# Patient Record
Sex: FEMALE | Race: WHITE | NOT HISPANIC OR LATINO | Employment: FULL TIME | URBAN - METROPOLITAN AREA
[De-identification: names, ages, dates, MRNs, and addresses within clinical notes are randomized per-mention and may not be internally consistent; named-entity substitution may affect disease eponyms.]

---

## 2018-01-29 ENCOUNTER — HOSPITAL ENCOUNTER (OUTPATIENT)
Facility: MEDICAL CENTER | Age: 18
End: 2018-01-29
Attending: OBSTETRICS & GYNECOLOGY
Payer: COMMERCIAL

## 2018-01-29 ENCOUNTER — HOSPITAL ENCOUNTER (OUTPATIENT)
Dept: LAB | Facility: MEDICAL CENTER | Age: 18
End: 2018-01-29
Attending: OBSTETRICS & GYNECOLOGY
Payer: COMMERCIAL

## 2018-01-29 ENCOUNTER — GYNECOLOGY VISIT (OUTPATIENT)
Dept: OBGYN | Facility: CLINIC | Age: 18
End: 2018-01-29
Payer: COMMERCIAL

## 2018-01-29 VITALS — DIASTOLIC BLOOD PRESSURE: 68 MMHG | SYSTOLIC BLOOD PRESSURE: 108 MMHG | WEIGHT: 140 LBS

## 2018-01-29 DIAGNOSIS — Z87.19 HISTORY OF ORAL LESIONS: ICD-10-CM

## 2018-01-29 DIAGNOSIS — N89.8 VAGINA ITCHING: ICD-10-CM

## 2018-01-29 DIAGNOSIS — N63.0 BREAST LUMP: ICD-10-CM

## 2018-01-29 DIAGNOSIS — Z11.3 SCREENING FOR VENEREAL DISEASE: ICD-10-CM

## 2018-01-29 LAB
CANDIDA DNA VAG QL PROBE+SIG AMP: NEGATIVE
G VAGINALIS DNA VAG QL PROBE+SIG AMP: NEGATIVE
T VAGINALIS DNA VAG QL PROBE+SIG AMP: NEGATIVE

## 2018-01-29 PROCEDURE — 36415 COLL VENOUS BLD VENIPUNCTURE: CPT

## 2018-01-29 PROCEDURE — 86694 HERPES SIMPLEX NES ANTBDY: CPT

## 2018-01-29 PROCEDURE — 87480 CANDIDA DNA DIR PROBE: CPT

## 2018-01-29 PROCEDURE — 87510 GARDNER VAG DNA DIR PROBE: CPT

## 2018-01-29 PROCEDURE — 99385 PREV VISIT NEW AGE 18-39: CPT | Performed by: OBSTETRICS & GYNECOLOGY

## 2018-01-29 PROCEDURE — 87660 TRICHOMONAS VAGIN DIR PROBE: CPT

## 2018-01-29 RX ORDER — LEVONORGESTREL AND ETHINYL ESTRADIOL 0.1-0.02MG
1 KIT ORAL DAILY
COMMUNITY
End: 2018-06-06 | Stop reason: SDUPTHER

## 2018-01-29 ASSESSMENT — ENCOUNTER SYMPTOMS
WEIGHT LOSS: 0
FEVER: 0
CHILLS: 0
PALPITATIONS: 0
MYALGIAS: 0
DIZZINESS: 0
DEPRESSION: 0
COUGH: 0

## 2018-01-29 NOTE — PROGRESS NOTES
Subjective:      Marisel Ramirez is a 17 y.o. female who presents with Gynecologic Exam            HPI   17-year-old G0 last menstrual cycle was approximately one week ago. Patient is here today without complaints and desires screening for HSV because she occasionally has some tingling on her lips. She also states she had a recent yeast infection and would like screening for that as well. She is currently without complaints reports regular monthly menstrual cycles [place] through bleeding or spotting    No history of abnormal Pap smears or sexually transmitted diseases      Review of Systems   Constitutional: Negative for chills, fever, malaise/fatigue and weight loss.   HENT: Negative for hearing loss.    Respiratory: Negative for cough.    Cardiovascular: Negative for chest pain and palpitations.   Genitourinary: Negative for dysuria.   Musculoskeletal: Negative for myalgias.   Skin: Negative for rash.   Neurological: Negative for dizziness.   Psychiatric/Behavioral: Negative for depression.          Objective:     /68   Wt 63.5 kg (140 lb)   LMP 01/23/2018   Breastfeeding? No      Physical Exam   Constitutional: She is oriented to person, place, and time. She appears well-developed and well-nourished.   Neck: Normal range of motion. Neck supple.   Cardiovascular: Normal rate and regular rhythm.    Pulmonary/Chest: She exhibits no mass and no deformity. Right breast exhibits no inverted nipple, no mass, no nipple discharge, no skin change and no tenderness. Left breast exhibits mass. Left breast exhibits no inverted nipple, no nipple discharge, no skin change and no tenderness. Breasts are symmetrical. There is no breast swelling.       Abdominal: Soft.   Genitourinary: Vagina normal. No breast tenderness, discharge or bleeding.   Neurological: She is alert and oriented to person, place, and time.   Skin: Skin is warm and dry.   Psychiatric: She has a normal mood and affect.   Nursing note and vitals  reviewed.              Assessment/Plan:     1. History of oral lesions    - HSV 1/2 IGG W/ TYPE SPECIFIC RFLX; Future    2. Screening for venereal disease  - CHLAMYDIA/GC AMP URINE OR SWAB    3. Breast lump    - US-BREAST COMPLETE-LEFT; Future    4. Vagina itching    - VAGINAL PATHOGENS DNA PANEL; Future

## 2018-01-30 LAB
AMBIGUOUS DTTM AMBI4: NORMAL
SIGNIFICANT IND 70042: NORMAL
SITE SITE: NORMAL
SOURCE SOURCE: NORMAL

## 2018-01-31 LAB — HSV1+2 IGG SER IA-ACNC: 0.54 IV

## 2018-04-20 ENCOUNTER — OFFICE VISIT (OUTPATIENT)
Dept: MEDICAL GROUP | Facility: MEDICAL CENTER | Age: 18
End: 2018-04-20
Payer: COMMERCIAL

## 2018-04-20 VITALS
HEART RATE: 75 BPM | RESPIRATION RATE: 16 BRPM | TEMPERATURE: 97.8 F | SYSTOLIC BLOOD PRESSURE: 110 MMHG | DIASTOLIC BLOOD PRESSURE: 62 MMHG | OXYGEN SATURATION: 96 % | WEIGHT: 140.8 LBS | HEIGHT: 67 IN | BODY MASS INDEX: 22.1 KG/M2

## 2018-04-20 DIAGNOSIS — Z11.3 SCREENING FOR STD (SEXUALLY TRANSMITTED DISEASE): ICD-10-CM

## 2018-04-20 DIAGNOSIS — Z30.09 FAMILY PLANNING: ICD-10-CM

## 2018-04-20 DIAGNOSIS — R30.0 DYSURIA: ICD-10-CM

## 2018-04-20 LAB
APPEARANCE UR: CLEAR
BILIRUB UR STRIP-MCNC: NEGATIVE MG/DL
COLOR UR AUTO: NORMAL
GLUCOSE UR STRIP.AUTO-MCNC: NEGATIVE MG/DL
KETONES UR STRIP.AUTO-MCNC: NEGATIVE MG/DL
LEUKOCYTE ESTERASE UR QL STRIP.AUTO: NEGATIVE
NITRITE UR QL STRIP.AUTO: NEGATIVE
PH UR STRIP.AUTO: 5 [PH] (ref 5–8)
PROT UR QL STRIP: NORMAL MG/DL
RBC UR QL AUTO: NORMAL
SP GR UR STRIP.AUTO: 1.02
UROBILINOGEN UR STRIP-MCNC: NEGATIVE MG/DL

## 2018-04-20 PROCEDURE — 99203 OFFICE O/P NEW LOW 30 MIN: CPT | Mod: 25 | Performed by: NURSE PRACTITIONER

## 2018-04-20 PROCEDURE — 99000 SPECIMEN HANDLING OFFICE-LAB: CPT | Performed by: NURSE PRACTITIONER

## 2018-04-20 PROCEDURE — 81002 URINALYSIS NONAUTO W/O SCOPE: CPT | Performed by: NURSE PRACTITIONER

## 2018-04-20 ASSESSMENT — PATIENT HEALTH QUESTIONNAIRE - PHQ9: CLINICAL INTERPRETATION OF PHQ2 SCORE: 0

## 2018-04-20 NOTE — ASSESSMENT & PLAN NOTE
Intermittent difficulty with dysuria over the last several weeks, not occurring with every urination. She does note a difference when she increases her water consumption. No urinary frequency, odor, hematuria

## 2018-04-20 NOTE — PROGRESS NOTES
"Subjective:     Chief Complaint   Patient presents with   • Establish Care   • UTI     Marisel Ramirez is a 17 y.o. female here today to establish care. She is a student at [employer], studying business. She lives with roommates. Originally from [place]. We discussed:    Family planning  Doing well with current OCP  Not currently sexually active.  She reports having had chlamydia infection which was treated last year, she would like retesting. Denies symptoms including discharge and irritation  No hx of HTN, DVT/ clotting disorder.    Dysuria  Intermittent difficulty with dysuria over the last several weeks, not occurring with every urination. She does note a difference when she increases her water consumption. No urinary frequency, odor, hematuria       Current medicines (including changes today)  Current Outpatient Prescriptions   Medication Sig Dispense Refill   • levonorgestrel-ethinyl estradiol (AVIANE) 0.1-20 MG-MCG per tablet Take 1 Tab by mouth every day.       No current facility-administered medications for this visit.      She  has a past medical history of Migraine.    ROS included above     Objective:     Blood pressure 110/62, pulse 75, temperature 36.6 °C (97.8 °F), resp. rate 16, height 1.69 m (5' 6.53\"), weight 63.9 kg (140 lb 12.8 oz), last menstrual period 04/15/2018, SpO2 96 %, not currently breastfeeding. Body mass index is 22.36 kg/m².     Physical Exam:  General: Alert, oriented in no acute distress.  Eye contact is good, speech is normal, affect calm  HEENT: Oral mucosa pink moist, no lesions. TMs gray with good landmarks bilaterally. No lymphadenopathy.  Lungs: clear to auscultation bilaterally, normal effort, no wheeze/ rhonchi/ rales.  CV: regular rate and rhythm, S1, S2, no murmur  Abdomen: soft, nontender  Ext: no edema, color normal, vascularity normal, temperature normal    Assessment and Plan:   The following treatment plan was discussed   1. Family planning  She is doing well with current " oral contraceptive and does not need refill at this time.    2. Dysuria  UA with trace amount of blood, states that she just finished her menses. Otherwise no indication of infection. Advised increase water consumption, follow-up for persisting concerns  POCT Urinalysis   3. Screening for STD (sexually transmitted disease)  History of chlamydia which was treated. Requesting retesting. No symptoms. Self-administered vaginal swab sent  CHLAMYDIA/GC PCR URINE OR SWAB       Followup: Pending labs         Please note that this dictation was created using voice recognition software. I have worked with consultants from the vendor as well as technical experts from [facility] to optimize the interface. I have made every reasonable attempt to correct obvious errors, but I expect that there are errors of grammar and possibly content that I did not discover before finalizing the note.

## 2018-04-20 NOTE — ASSESSMENT & PLAN NOTE
Doing well with current OCP  Not currently sexually active.  She reports having had chlamydia infection which was treated last year, she would like retesting. Denies symptoms including discharge and irritation  No hx of HTN, DVT/ clotting disorder.

## 2018-04-23 ENCOUNTER — TELEPHONE (OUTPATIENT)
Dept: MEDICAL GROUP | Facility: MEDICAL CENTER | Age: 18
End: 2018-04-23

## 2018-04-23 DIAGNOSIS — Z11.3 SCREEN FOR STD (SEXUALLY TRANSMITTED DISEASE): ICD-10-CM

## 2018-04-23 NOTE — TELEPHONE ENCOUNTER
Urine test ordered. Please inform pt and notify her it must be at least 2 hours since last void for test to be accurate. She can  order or we can fax.

## 2018-04-23 NOTE — TELEPHONE ENCOUNTER
Phone Number Called: [employer] [phone]    Message: Received fax from [employer] stating wrong Chlamydia specimen was sent - Calling for more information. Wrong swab used.     Left Message for patient to call back: no

## 2018-04-23 NOTE — TELEPHONE ENCOUNTER
Phone Number Called: [phone] (home)     Message: Left message for pt to call back at [phone].     Left Message for patient to call back: yes

## 2018-04-23 NOTE — TELEPHONE ENCOUNTER
[facility] rep stated they can take a urine sample to complete the test at any of their lab locations.

## 2018-05-10 ENCOUNTER — APPOINTMENT (RX ONLY)
Dept: URBAN - METROPOLITAN AREA CLINIC 4 | Facility: CLINIC | Age: 18
Setting detail: DERMATOLOGY
End: 2018-05-10

## 2018-05-10 DIAGNOSIS — L259 CONTACT DERMATITIS AND OTHER ECZEMA, UNSPECIFIED CAUSE: ICD-10-CM

## 2018-05-10 PROBLEM — H01.131 ECZEMATOUS DERMATITIS OF RIGHT UPPER EYELID: Status: ACTIVE | Noted: 2018-05-10

## 2018-05-10 PROBLEM — L30.8 OTHER SPECIFIED DERMATITIS: Status: ACTIVE | Noted: 2018-05-10

## 2018-05-10 PROCEDURE — ? PRESCRIPTION

## 2018-05-10 PROCEDURE — ? COUNSELING

## 2018-05-10 PROCEDURE — ? ADDITIONAL NOTES

## 2018-05-10 PROCEDURE — 99202 OFFICE O/P NEW SF 15 MIN: CPT

## 2018-05-10 RX ORDER — PIMECROLIMUS 10 MG/G
CREAM TOPICAL
Qty: 60 | Refills: 6 | Status: ERX | COMMUNITY
Start: 2018-05-10

## 2018-05-10 RX ADMIN — PIMECROLIMUS: 10 CREAM TOPICAL at 00:00

## 2018-05-10 ASSESSMENT — LOCATION ZONE DERM: LOCATION ZONE: EYELID

## 2018-05-10 ASSESSMENT — LOCATION DETAILED DESCRIPTION DERM
LOCATION DETAILED: RIGHT LATERAL SUPERIOR EYELID
LOCATION DETAILED: LEFT LATERAL SUPERIOR EYELID

## 2018-05-10 ASSESSMENT — LOCATION SIMPLE DESCRIPTION DERM
LOCATION SIMPLE: RIGHT SUPERIOR EYELID
LOCATION SIMPLE: LEFT SUPERIOR EYELID

## 2018-05-10 NOTE — PROCEDURE: ADDITIONAL NOTES
Additional Notes: Patient advised to take a Claritin in the morning and at night. \\nPatient advised to keep a symptom log. \\nPatient advised to use and keep a fragrance free and dye free protocol. \\nPatient advised to avoid any harsh face washes, eyeshadow and other makeup products. \\nWill recheck progress in 3 weeks. \\nAdvised patient to contact our office if rash worsens.

## 2018-05-10 NOTE — HPI: RASH (EYELID DERMATITIS)
Is This A New Presentation, Or A Follow-Up?: Rash
How Severe Is Your Rash?: mild
Response To Previous Treatment?: topical treatments have been effective, but the eruption recurs after stopping the medication
Additional History: Patient states she has a history of eczema. The rash started on her eyelids about 6 months ago. Patient states that she went to a Doctor in [place] in January who have her an Rx Moisturizer (patient does not know the name) which helped with the scaliness but did not resolve the rash.

## 2018-05-31 ENCOUNTER — APPOINTMENT (RX ONLY)
Dept: URBAN - METROPOLITAN AREA CLINIC 4 | Facility: CLINIC | Age: 18
Setting detail: DERMATOLOGY
End: 2018-05-31

## 2018-05-31 DIAGNOSIS — L259 CONTACT DERMATITIS AND OTHER ECZEMA, UNSPECIFIED CAUSE: ICD-10-CM

## 2018-05-31 DIAGNOSIS — L70.0 ACNE VULGARIS: ICD-10-CM

## 2018-05-31 PROBLEM — L30.8 OTHER SPECIFIED DERMATITIS: Status: ACTIVE | Noted: 2018-05-31

## 2018-05-31 PROCEDURE — ? COUNSELING

## 2018-05-31 PROCEDURE — 99213 OFFICE O/P EST LOW 20 MIN: CPT

## 2018-05-31 PROCEDURE — ? PRESCRIPTION

## 2018-05-31 PROCEDURE — ? TREATMENT REGIMEN

## 2018-05-31 RX ORDER — ADAPALENE AND BENZOYL PEROXIDE 1; 25 MG/G; MG/G
GEL TOPICAL
Qty: 1 | Refills: 6 | Status: ERX | COMMUNITY
Start: 2018-05-31

## 2018-05-31 RX ADMIN — ADAPALENE AND BENZOYL PEROXIDE: 1; 25 GEL TOPICAL at 15:51

## 2018-05-31 ASSESSMENT — LOCATION SIMPLE DESCRIPTION DERM
LOCATION SIMPLE: LEFT CHEEK
LOCATION SIMPLE: CHIN
LOCATION SIMPLE: LEFT FOREHEAD
LOCATION SIMPLE: LEFT SUPERIOR EYELID
LOCATION SIMPLE: RIGHT CHEEK
LOCATION SIMPLE: RIGHT EYEBROW

## 2018-05-31 ASSESSMENT — LOCATION DETAILED DESCRIPTION DERM
LOCATION DETAILED: RIGHT INFERIOR MEDIAL MALAR CHEEK
LOCATION DETAILED: RIGHT CHIN
LOCATION DETAILED: LEFT INFERIOR MEDIAL FOREHEAD
LOCATION DETAILED: LEFT INFERIOR CENTRAL MALAR CHEEK
LOCATION DETAILED: LEFT CHIN
LOCATION DETAILED: LEFT LATERAL SUPERIOR EYELID
LOCATION DETAILED: RIGHT CENTRAL EYEBROW
LOCATION DETAILED: RIGHT INFERIOR CENTRAL MALAR CHEEK

## 2018-05-31 ASSESSMENT — LOCATION ZONE DERM
LOCATION ZONE: FACE
LOCATION ZONE: EYELID

## 2018-05-31 NOTE — PROCEDURE: COUNSELING
Spironolactone Counseling: Patient advised regarding risks of diarrhea, abdominal pain, hyperkalemia, birth defects (for female patients), liver toxicity and renal toxicity. The patient may need blood work to monitor liver and kidney function and potassium levels while on therapy. The patient verbalized understanding of the proper use and possible adverse effects of spironolactone.  All of the patient's questions and concerns were addressed.
Tetracycline Counseling: Patient counseled regarding possible photosensitivity and increased risk for sunburn.  Patient instructed to avoid sunlight, if possible.  When exposed to sunlight, patients should wear protective clothing, sunglasses, and sunscreen.  The patient was instructed to call the office immediately if the following severe adverse effects occur:  hearing changes, easy bruising/bleeding, severe headache, or vision changes.  The patient verbalized understanding of the proper use and possible adverse effects of tetracycline.  All of the patient's questions and concerns were addressed. Patient understands to avoid pregnancy while on therapy due to potential birth defects.
Erythromycin Pregnancy And Lactation Text: This medication is Pregnancy Category B and is considered safe during pregnancy. It is also excreted in breast milk.
Spironolactone Pregnancy And Lactation Text: This medication can cause feminization of the male fetus and should be avoided during pregnancy. The active metabolite is also found in breast milk.
Topical Clindamycin Counseling: Patient counseled that this medication may cause skin irritation or allergic reactions.  In the event of skin irritation, the patient was advised to reduce the amount of the drug applied or use it less frequently.   The patient verbalized understanding of the proper use and possible adverse effects of clindamycin.  All of the patient's questions and concerns were addressed.
Birth Control Pills Pregnancy And Lactation Text: This medication should be avoided if pregnant and for the first 30 days post-partum.
Doxycycline Counseling:  Patient counseled regarding possible photosensitivity and increased risk for sunburn.  Patient instructed to avoid sunlight, if possible.  When exposed to sunlight, patients should wear protective clothing, sunglasses, and sunscreen.  The patient was instructed to call the office immediately if the following severe adverse effects occur:  hearing changes, easy bruising/bleeding, severe headache, or vision changes.  The patient verbalized understanding of the proper use and possible adverse effects of doxycycline.  All of the patient's questions and concerns were addressed.
Isotretinoin Pregnancy And Lactation Text: This medication is Pregnancy Category X and is considered extremely dangerous during pregnancy. It is unknown if it is excreted in breast milk.
Tazorac Pregnancy And Lactation Text: This medication is not safe during pregnancy. It is unknown if this medication is excreted in breast milk.
Topical Sulfur Applications Counseling: Topical Sulfur Counseling: Patient counseled that this medication may cause skin irritation or allergic reactions.  In the event of skin irritation, the patient was advised to reduce the amount of the drug applied or use it less frequently.   The patient verbalized understanding of the proper use and possible adverse effects of topical sulfur application.  All of the patient's questions and concerns were addressed.
Tazorac Counseling:  Patient advised that medication is irritating and drying.  Patient may need to apply sparingly and wash off after an hour before eventually leaving it on overnight.  The patient verbalized understanding of the proper use and possible adverse effects of tazorac.  All of the patient's questions and concerns were addressed.
High Dose Vitamin A Pregnancy And Lactation Text: High dose vitamin A therapy is contraindicated during pregnancy and breast feeding.
Minocycline Counseling: Patient advised regarding possible photosensitivity and discoloration of the teeth, skin, lips, tongue and gums.  Patient instructed to avoid sunlight, if possible.  When exposed to sunlight, patients should wear protective clothing, sunglasses, and sunscreen.  The patient was instructed to call the office immediately if the following severe adverse effects occur:  hearing changes, easy bruising/bleeding, severe headache, or vision changes.  The patient verbalized understanding of the proper use and possible adverse effects of minocycline.  All of the patient's questions and concerns were addressed.
Include Pregnancy/Lactation Warning?: No
Minocycline Pregnancy And Lactation Text: This medication is Pregnancy Category D and not consider safe during pregnancy. It is also excreted in breast milk.
Benzoyl Peroxide Pregnancy And Lactation Text: This medication is Pregnancy Category C. It is unknown if benzoyl peroxide is excreted in breast milk.
Bactrim Pregnancy And Lactation Text: This medication is Pregnancy Category D and is known to cause fetal risk.  It is also excreted in breast milk.
Erythromycin Counseling:  I discussed with the patient the risks of erythromycin including but not limited to GI upset, allergic reaction, drug rash, diarrhea, increase in liver enzymes, and yeast infections.
Topical Retinoid Pregnancy And Lactation Text: This medication is Pregnancy Category C. It is unknown if this medication is excreted in breast milk.
Bactrim Counseling:  I discussed with the patient the risks of sulfa antibiotics including but not limited to GI upset, allergic reaction, drug rash, diarrhea, dizziness, photosensitivity, and yeast infections.  Rarely, more serious reactions can occur including but not limited to aplastic anemia, agranulocytosis, methemoglobinemia, blood dyscrasias, liver or kidney failure, lung infiltrates or desquamative/blistering drug rashes.
High Dose Vitamin A Counseling: Side effects reviewed, pt to contact office should one occur.
Topical Retinoid counseling:  Patient advised to apply a pea-sized amount only at bedtime and wait 30 minutes after washing their face before applying.  If too drying, patient may add a non-comedogenic moisturizer. The patient verbalized understanding of the proper use and possible adverse effects of retinoids.  All of the patient's questions and concerns were addressed.
Birth Control Pills Counseling: Birth Control Pill Counseling: I discussed with the patient the potential side effects of OCPs including but not limited to increased risk of stroke, heart attack, thrombophlebitis, deep venous thrombosis, hepatic adenomas, breast changes, GI upset, headaches, and depression.  The patient verbalized understanding of the proper use and possible adverse effects of OCPs. All of the patient's questions and concerns were addressed.
Dapsone Pregnancy And Lactation Text: This medication is Pregnancy Category C and is not considered safe during pregnancy or breast feeding.
Isotretinoin Counseling: Patient should get monthly blood tests, not donate blood, not drive at night if vision affected, not share medication, and not undergo elective surgery for 6 months after tx completed. Side effects reviewed, pt to contact office should one occur.
Detail Level: Zone
Topical Clindamycin Pregnancy And Lactation Text: This medication is Pregnancy Category B and is considered safe during pregnancy. It is unknown if it is excreted in breast milk.
Dapsone Counseling: I discussed with the patient the risks of dapsone including but not limited to hemolytic anemia, agranulocytosis, rashes, methemoglobinemia, kidney failure, peripheral neuropathy, headaches, GI upset, and liver toxicity.  Patients who start dapsone require monitoring including baseline LFTs and weekly CBCs for the first month, then every month thereafter.  The patient verbalized understanding of the proper use and possible adverse effects of dapsone.  All of the patient's questions and concerns were addressed.
Azithromycin Pregnancy And Lactation Text: This medication is considered safe during pregnancy and is also secreted in breast milk.
Benzoyl Peroxide Counseling: Patient counseled that medicine may cause skin irritation and bleach clothing.  In the event of skin irritation, the patient was advised to reduce the amount of the drug applied or use it less frequently.   The patient verbalized understanding of the proper use and possible adverse effects of benzoyl peroxide.  All of the patient's questions and concerns were addressed.
Topical Sulfur Applications Pregnancy And Lactation Text: This medication is Pregnancy Category C and has an unknown safety profile during pregnancy. It is unknown if this topical medication is excreted in breast milk.
Azithromycin Counseling:  I discussed with the patient the risks of azithromycin including but not limited to GI upset, allergic reaction, drug rash, diarrhea, and yeast infections.
Doxycycline Pregnancy And Lactation Text: This medication is Pregnancy Category D and not consider safe during pregnancy. It is also excreted in breast milk but is considered safe for shorter treatment courses.

## 2018-05-31 NOTE — HPI: PIMPLES (ACNE)
How Severe Is Your Acne?: moderate
Is This A New Presentation, Or A Follow-Up?: Acne
Females Only: When Was Your Last Menstrual Period?: 05/14/18

## 2018-05-31 NOTE — PROCEDURE: TREATMENT REGIMEN
Initiate Treatment: Wash face with gentle Cetaphil cleanser. Apply daily non-comedogenic, spf moisturizer containing zinc.\\nWash face with gentle Cetaphil cleanser each night. Then apply adapelene-benzoyl peroxide gel to entire face. Apply nightly non comedogenic moisturizer.
Notification: Please note that there are new Treatment Regimen plans which have an enhanced patient education handout experience.  The plans are called Treatment Regimen (Acne), Treatment Regimen (Atopic Dermatitis), Treatment Regimen (Rosacea), Treatment Regimen (Sun Protection), Treatment Regimen (Cosmetic Products), and Treatment Regimen (Xerosis).
Detail Level: Zone

## 2018-06-06 RX ORDER — LEVONORGESTREL AND ETHINYL ESTRADIOL 0.1-0.02MG
1 KIT ORAL DAILY
Qty: 84 TAB | Refills: 3 | Status: SHIPPED | OUTPATIENT
Start: 2018-06-06 | End: 2020-02-19

## 2018-06-27 ENCOUNTER — GYNECOLOGY VISIT (OUTPATIENT)
Dept: OBGYN | Facility: CLINIC | Age: 18
End: 2018-06-27
Payer: COMMERCIAL

## 2018-06-27 VITALS
WEIGHT: 138 LBS | HEIGHT: 67 IN | SYSTOLIC BLOOD PRESSURE: 100 MMHG | DIASTOLIC BLOOD PRESSURE: 60 MMHG | BODY MASS INDEX: 21.66 KG/M2

## 2018-06-27 DIAGNOSIS — Z11.3 SCREENING FOR STDS (SEXUALLY TRANSMITTED DISEASES): ICD-10-CM

## 2018-06-27 PROCEDURE — 99213 OFFICE O/P EST LOW 20 MIN: CPT | Performed by: OBSTETRICS & GYNECOLOGY

## 2018-06-27 NOTE — PROGRESS NOTES
"Chief complaint dysuria screening for sexually transmitted diseases    17-year-old G0 who is here today complaining of a history of dysuria approximately 2 weeks ago. The dysuria has since resolved . She is also desiring screening for sexually transmitted diseases. Today she is without complaints. She denies abnormal vaginal bleeding or pelvic pain or pressure no abdominal pain fever nausea vomiting or shortness of breath. She denies vaginal discharge.    /60   Ht 1.69 m (5' 6.53\")   Wt 62.6 kg (138 lb)   LMP 06/09/2018   BMI 21.92 kg/m²   Abdomen soft and nontender  Normal external female genitalia  No abnormal vaginal discharge was appreciated  Cervix was normal  No cervical motion tenderness  Uterus small nontender mobile  Adnexa bilaterally not tender    GC chlamydia swab is collected    Urinalysis normal    Assessment and plan  17-year-old female with normal urinalysis resolved dysuria  GC chlamydia culture obtained  Follow-up as needed or one year  "

## 2018-06-29 LAB
C TRACH DNA SPEC QL NAA+PROBE: NOT DETECTED
N GONORRHOEA DNA SPEC QL NAA+PROBE: NOT DETECTED

## 2018-10-03 ENCOUNTER — OFFICE VISIT (OUTPATIENT)
Dept: MEDICAL GROUP | Facility: MEDICAL CENTER | Age: 18
End: 2018-10-03
Payer: COMMERCIAL

## 2018-10-03 VITALS
OXYGEN SATURATION: 96 % | HEIGHT: 67 IN | DIASTOLIC BLOOD PRESSURE: 62 MMHG | SYSTOLIC BLOOD PRESSURE: 102 MMHG | HEART RATE: 77 BPM | BODY MASS INDEX: 20.88 KG/M2 | WEIGHT: 133 LBS | TEMPERATURE: 97.9 F

## 2018-10-03 DIAGNOSIS — N75.0 CYST OF RIGHT BARTHOLIN'S GLAND: ICD-10-CM

## 2018-10-03 PROCEDURE — 99214 OFFICE O/P EST MOD 30 MIN: CPT | Performed by: FAMILY MEDICINE

## 2018-10-03 NOTE — PATIENT INSTRUCTIONS
Bartholin Cyst or Abscess  Introduction  A Bartholin cyst is a fluid-filled sac that forms on a Bartholin gland. Bartholin glands are small glands that are located within the folds of skin (labia) along the sides of the lower opening of the vagina. These glands produce a fluid to moisten the outside of the vagina during sexual intercourse.  A Bartholin cyst causes a bulge on the side of the vagina. A cyst that is not large or infected may not cause symptoms or problems. However, if the fluid within the cyst becomes infected, the cyst can turn into an abscess. An abscess may cause discomfort or pain.  What are the causes?  A Bartholin cyst may develop when the duct of the gland becomes blocked. In many cases, the cause of this is not known. Various kinds of bacteria can cause the cyst to become infected and develop into an abscess.  What increases the risk?  You may be at an increased risk of developing a Bartholin cyst or abscess if:  · You are a woman of reproductive age.  · You have a history of previous Bartholin cysts or abscesses.  · You have diabetes.  · You have a sexually transmitted disease (STD).  What are the signs or symptoms?  The severity of symptoms varies depending on the size of the cyst and whether it is infected. Symptoms may include:  · A bulge or swelling near the lower opening of your vagina.  · Discomfort or pain.  · Redness.  · Pain during sexual intercourse.  · Pain when walking.  · Fluid draining from the area.  How is this diagnosed?  Your health care provider may make a diagnosis based on your symptoms and a physical exam. He or she will look for swelling in your vaginal area. Blood tests may be done to check for infections. A sample of fluid from the cyst or abscess may also be taken to be tested in a lab.  How is this treated?  Small cysts that are not infected may not require any treatment. These often go away on their own. Your health care provider will recommend hot baths and the use  of warm compresses. These may also be part of the treatment for an abscess. Treatment options for a large cyst or abscess may include:  · Antibiotic medicine.  · A surgical procedure to drain the abscess. One of the following procedures may be done:  ¨ Incision and drainage. An incision is made in the cyst or abscess so that the fluid drains out. A catheter may be placed inside the cyst so that it does not close and fill up with fluid again. The catheter will be removed after you have a follow-up visit with a specialist (gynecologist).  ¨ Marsupialization. The cyst or abscess is opened and kept open by stitching the edges of the skin to the walls of the cyst or abscess. This allows it to continue to drain and not fill up with fluid again.  If you have cysts or abscesses that keep returning and have required incision and drainage multiple times, your health care provider may talk to you about surgery to remove the Bartholin gland.  Follow these instructions at home:  · Take medicines only as directed by your health care provider.  · If you were prescribed an antibiotic medicine, finish it all even if you start to feel better.  · Apply warm, wet compresses to the area or take warm, shallow baths that cover your pelvic region (sitz baths) several times a day or as directed by your health care provider.  · Do not squeeze the cyst or apply heavy pressure to it.  · Do not have sexual intercourse until the cyst has gone away.  · If your cyst or abscess was opened, a small piece of gauze or a drain may have been placed in the area to allow drainage. Do not remove the gauze or the drain until directed by your health care provider.  · Wear feminine pads--not tampons--as needed for any drainage or bleeding.  · Keep all follow-up visits as directed by your health care provider. This is important.  How is this prevented?  Take these steps to help prevent a Bartholin cyst from returning:  · Practice good hygiene.  · Clean your  vaginal area with mild soap and a soft cloth when you bathe.  · Practice safe sex to prevent STDs.  Contact a health care provider if:  · You have increased pain, swelling, or redness in the area of the cyst.  · Puslike drainage is coming from the cyst.  · You have a fever.  This information is not intended to replace advice given to you by your health care provider. Make sure you discuss any questions you have with your health care provider.  Document Released: 12/18/2006 Document Revised: 05/25/2017 Document Reviewed: 08/03/2015  © 2017 [employer]

## 2018-10-03 NOTE — PROGRESS NOTES
"Subjective:   Marisel Ramirez is a 18 y.o. female here today for cyst    Cyst of right Bartholin's gland  For the last few days patient has had a right sided labial swelling, that is slightly painful.  She states that it has gone down in size.  Patient believes she has had this in the past, but it resolved spontaneously. Patient denies any fever.         Current medicines (including changes today)  Current Outpatient Prescriptions   Medication Sig Dispense Refill   • levonorgestrel-ethinyl estradiol (AVIANE) 0.1-20 MG-MCG per tablet Take 1 Tab by mouth every day. 84 Tab 3     No current facility-administered medications for this visit.      She  has a past medical history of Migraine.    ROS   No abdominal pain, no dysuria       Objective:     Blood pressure 102/62, pulse 77, temperature 36.6 °C (97.9 °F), height 1.689 m (5' 6.5\"), weight 60.3 kg (133 lb), SpO2 96 %, not currently breastfeeding. Body mass index is 21.15 kg/m².   Physical Exam:  Constitutional: Alert, no distress.  Skin: Warm, dry, good turgor, no rashes in visible areas.  Eye: Equal, round and reactive, conjunctiva clear, lids normal.  Psych: Alert and oriented x3, normal affect and mood.  Vaginal: Right nontender, nonerythemous Bartholin cyst with no drainage or ulceration.      Assessment and Plan:   The following treatment plan was discussed    1. Cyst of right Bartholin's gland  Patient would like to try home treatments such as sitz bathes. If no improvement, things get worse or more symptomatic, then she is to call for a GYN referral.      Followup: Return if symptoms worsen or fail to improve.         A chaperone was offered to the patient during today's exam. Chaperone name: Marielle Roche was present.    "

## 2018-10-03 NOTE — ASSESSMENT & PLAN NOTE
For the last few days patient has had a right sided labial swelling, that is slightly painful.  She states that it has gone down in size.  Patient believes she has had this in the past, but it resolved spontaneously. Patient denies any fever.

## 2018-10-31 ENCOUNTER — APPOINTMENT (RX ONLY)
Dept: URBAN - METROPOLITAN AREA CLINIC 4 | Facility: CLINIC | Age: 18
Setting detail: DERMATOLOGY
End: 2018-10-31

## 2018-10-31 DIAGNOSIS — L70.0 ACNE VULGARIS: ICD-10-CM

## 2018-10-31 PROCEDURE — ? TREATMENT REGIMEN

## 2018-10-31 NOTE — PROCEDURE: TREATMENT REGIMEN
Notification: Please note that there are new Treatment Regimen plans which have an enhanced patient education handout experience.  The plans are called Treatment Regimen (Acne), Treatment Regimen (Atopic Dermatitis), Treatment Regimen (Rosacea), Treatment Regimen (Sun Protection), Treatment Regimen (Cosmetic Products), and Treatment Regimen (Xerosis).
Initiate Treatment: Wash affected areas with Benzoyl Peroxide each morning, leave on for 2-5 minutes then rinse. Apply topical Clindamycin solution and let dry. Apply daily non-comedogenic, spf moisturizer containing zinc.\\nWash face with gentle Cetaphil cleanser each night. Apply 1/2 pea sized amount of tretinoin 0.05% mixed with nightly non comedogenic moisturizer to entire face.
Detail Level: Zone

## 2019-01-01 ENCOUNTER — OFFICE VISIT (OUTPATIENT)
Dept: URGENT CARE | Facility: CLINIC | Age: 19
End: 2019-01-01
Payer: COMMERCIAL

## 2019-01-01 VITALS
TEMPERATURE: 98.4 F | HEART RATE: 123 BPM | OXYGEN SATURATION: 90 % | RESPIRATION RATE: 18 BRPM | DIASTOLIC BLOOD PRESSURE: 62 MMHG | SYSTOLIC BLOOD PRESSURE: 106 MMHG

## 2019-01-01 DIAGNOSIS — J02.9 ACUTE PHARYNGITIS, UNSPECIFIED ETIOLOGY: ICD-10-CM

## 2019-01-01 DIAGNOSIS — F17.200 SMOKER: ICD-10-CM

## 2019-01-01 DIAGNOSIS — R06.2 WHEEZING: ICD-10-CM

## 2019-01-01 DIAGNOSIS — R06.02 SOB (SHORTNESS OF BREATH): ICD-10-CM

## 2019-01-01 DIAGNOSIS — J20.9 ACUTE BRONCHITIS, UNSPECIFIED ORGANISM: ICD-10-CM

## 2019-01-01 PROCEDURE — 94640 AIRWAY INHALATION TREATMENT: CPT | Performed by: NURSE PRACTITIONER

## 2019-01-01 PROCEDURE — 99214 OFFICE O/P EST MOD 30 MIN: CPT | Mod: 25 | Performed by: NURSE PRACTITIONER

## 2019-01-01 RX ORDER — IPRATROPIUM BROMIDE AND ALBUTEROL SULFATE 2.5; .5 MG/3ML; MG/3ML
3 SOLUTION RESPIRATORY (INHALATION) ONCE
Status: COMPLETED | OUTPATIENT
Start: 2019-01-01 | End: 2019-01-01

## 2019-01-01 RX ORDER — AZITHROMYCIN 250 MG/1
TABLET, FILM COATED ORAL
Qty: 6 TAB | Refills: 0 | Status: SHIPPED | OUTPATIENT
Start: 2019-01-01 | End: 2019-03-05

## 2019-01-01 RX ORDER — METHYLPREDNISOLONE 4 MG/1
4 TABLET ORAL DAILY
Qty: 1 KIT | Refills: 0 | Status: SHIPPED | OUTPATIENT
Start: 2019-01-01 | End: 2019-03-05

## 2019-01-01 RX ADMIN — IPRATROPIUM BROMIDE AND ALBUTEROL SULFATE 3 ML: 2.5; .5 SOLUTION RESPIRATORY (INHALATION) at 12:59

## 2019-01-01 ASSESSMENT — ENCOUNTER SYMPTOMS
WHEEZING: 1
SHORTNESS OF BREATH: 1
SORE THROAT: 1
COUGH: 1

## 2019-01-01 NOTE — PROGRESS NOTES
Subjective:      Marisel Ramirez is a 18 y.o. female who presents with Cough (x5 days, productive cough with green or clear mucus); Shortness of Breath (x5 days, wheezing); Nasal Congestion (x5 days); and Pharyngitis (x5 days)            Cough   This is a new problem. Episode onset: pt reports 5 days of coughing, ST, SOB and wheezing with coughing. She denies any recent fever. States she has not smoked weed in a week since getting sick. The problem has been gradually worsening. The cough is non-productive. Associated symptoms include a sore throat, shortness of breath and wheezing. Associated symptoms comments: States her throat feels really tight and it is hard to swallow. Drinking fluids ok. She has tried OTC cough suppressant and rest for the symptoms. The treatment provided no relief. Her past medical history is significant for bronchitis. There is no history of asthma.       Review of Systems   HENT: Positive for sore throat.    Respiratory: Positive for cough, shortness of breath and wheezing.    All other systems reviewed and are negative.    Past Medical History:   Diagnosis Date   • Migraine     No past surgical history on file.   Social History     Social History   • Marital status: Other     Spouse name: N/A   • Number of children: N/A   • Years of education: N/A     Occupational History   • Not on file.     Social History Main Topics   • Smoking status: Never Smoker   • Smokeless tobacco: Never Used   • Alcohol use No   • Drug use: Yes     Types: Marijuana   • Sexual activity: Yes     Partners: Male     Birth control/ protection: Pill     Other Topics Concern   • Not on file     Social History Narrative   • No narrative on file          Objective:     /62   Pulse (!) 123   Temp 36.9 °C (98.4 °F)   Resp 18   SpO2 90%      Physical Exam   Constitutional: She is oriented to person, place, and time. Vital signs are normal. She appears well-developed and well-nourished.   HENT:   Head: Normocephalic  and atraumatic.   Right Ear: External ear normal.   Left Ear: External ear normal.   Mouth/Throat: Posterior oropharyngeal erythema (mild) present.   Eyes: Pupils are equal, round, and reactive to light. EOM are normal.   Neck: Normal range of motion.   Cardiovascular: Normal rate and regular rhythm.    Pulmonary/Chest: Effort normal. She has wheezes. She has rhonchi.   Wheezes and tight breath sounds in all fields   Musculoskeletal: Normal range of motion.   Neurological: She is alert and oriented to person, place, and time.   Skin: Skin is warm and dry. Capillary refill takes less than 2 seconds.   Psychiatric: She has a normal mood and affect. Her speech is normal and behavior is normal. Thought content normal.   Vitals reviewed.              Assessment/Plan:     1. Acute pharyngitis, unspecified etiology  - azithromycin (ZITHROMAX) 250 MG Tab; Take 2 tabs PO on the first day, then one tab PO daily for 4 days  Dispense: 6 Tab; Refill: 0    2. Wheezing  - ipratropium-albuterol (DUONEB) nebulizer solution; 3 mL by Nebulization route Once.  - MethylPREDNISolone (MEDROL DOSEPAK) 4 MG Tablet Therapy Pack; Take 1 Tab by mouth every day.  Dispense: 1 Kit; Refill: 0    3. SOB (shortness of breath)  - ipratropium-albuterol (DUONEB) nebulizer solution; 3 mL by Nebulization route Once.  - MethylPREDNISolone (MEDROL DOSEPAK) 4 MG Tablet Therapy Pack; Take 1 Tab by mouth every day.  Dispense: 1 Kit; Refill: 0    4. Smoker    5. Acute bronchitis, unspecified organism  - azithromycin (ZITHROMAX) 250 MG Tab; Take 2 tabs PO on the first day, then one tab PO daily for 4 days  Dispense: 6 Tab; Refill: 0  - MethylPREDNISolone (MEDROL DOSEPAK) 4 MG Tablet Therapy Pack; Take 1 Tab by mouth every day.  Dispense: 1 Kit; Refill: 0    Pt reports improvement post neb, O2 sats remain in low 90s  Moving better air post neb eval  Take full course of ABX  Get plenty of rest  Smoking cessation discussed  Strict ER precautions for increased WOB,  worsening SOB or new onset of fevers  Supportive care, differential diagnoses, and indications for immediate follow-up discussed with patient.    Pathogenesis of diagnosis discussed including typical length and natural progression.      Instructed to return to  or nearest emergency department if symptoms fail to improve, for any change in condition, further concerns, or new concerning symptoms.  Patient states understanding of the plan of care and discharge instructions.

## 2019-03-05 ENCOUNTER — OFFICE VISIT (OUTPATIENT)
Dept: URGENT CARE | Facility: CLINIC | Age: 19
End: 2019-03-05
Payer: COMMERCIAL

## 2019-03-05 VITALS
HEART RATE: 140 BPM | BODY MASS INDEX: 21.38 KG/M2 | SYSTOLIC BLOOD PRESSURE: 126 MMHG | WEIGHT: 133 LBS | HEIGHT: 66 IN | RESPIRATION RATE: 17 BRPM | OXYGEN SATURATION: 88 % | DIASTOLIC BLOOD PRESSURE: 75 MMHG | TEMPERATURE: 99.7 F

## 2019-03-05 DIAGNOSIS — J45.21 MILD INTERMITTENT ASTHMA WITH ACUTE EXACERBATION: ICD-10-CM

## 2019-03-05 DIAGNOSIS — J06.9 VIRAL UPPER RESPIRATORY TRACT INFECTION: ICD-10-CM

## 2019-03-05 LAB
FLUAV+FLUBV AG SPEC QL IA: NEGATIVE
INT CON NEG: NEGATIVE
INT CON NEG: NEGATIVE
INT CON POS: POSITIVE
INT CON POS: POSITIVE
S PYO AG THROAT QL: NEGATIVE

## 2019-03-05 PROCEDURE — 87804 INFLUENZA ASSAY W/OPTIC: CPT | Performed by: PHYSICIAN ASSISTANT

## 2019-03-05 PROCEDURE — 87880 STREP A ASSAY W/OPTIC: CPT | Performed by: PHYSICIAN ASSISTANT

## 2019-03-05 PROCEDURE — 99214 OFFICE O/P EST MOD 30 MIN: CPT | Performed by: PHYSICIAN ASSISTANT

## 2019-03-05 RX ORDER — ALBUTEROL SULFATE 90 UG/1
1-2 AEROSOL, METERED RESPIRATORY (INHALATION) EVERY 6 HOURS PRN
Qty: 1 INHALER | Refills: 1 | Status: SHIPPED | OUTPATIENT
Start: 2019-03-05 | End: 2019-10-02 | Stop reason: SDUPTHER

## 2019-03-05 RX ORDER — PREDNISONE 20 MG/1
TABLET ORAL
Qty: 10 TAB | Refills: 0 | Status: SHIPPED | OUTPATIENT
Start: 2019-03-05 | End: 2019-06-12

## 2019-03-05 RX ORDER — IPRATROPIUM BROMIDE AND ALBUTEROL SULFATE 2.5; .5 MG/3ML; MG/3ML
3 SOLUTION RESPIRATORY (INHALATION) ONCE
Status: COMPLETED | OUTPATIENT
Start: 2019-03-05 | End: 2019-03-05

## 2019-03-05 RX ADMIN — IPRATROPIUM BROMIDE AND ALBUTEROL SULFATE 3 ML: 2.5; .5 SOLUTION RESPIRATORY (INHALATION) at 18:40

## 2019-03-05 ASSESSMENT — ENCOUNTER SYMPTOMS
RHINORRHEA: 1
HEADACHES: 0
CONSTIPATION: 0
DIARRHEA: 0
VOMITING: 0
EYE PAIN: 0
ABDOMINAL PAIN: 0
WHEEZING: 1
PALPITATIONS: 0
CLAUDICATION: 0
FEVER: 0
SWOLLEN GLANDS: 0
SORE THROAT: 1
LEG PAIN: 0
CHILLS: 0
COUGH: 1
NAUSEA: 0
SPUTUM PRODUCTION: 1
SINUS PAIN: 0
SHORTNESS OF BREATH: 1
MYALGIAS: 0
HEMOPTYSIS: 0
DIZZINESS: 0

## 2019-03-05 NOTE — LETTER
March 5, 2019         Patient: Marisel Ramirez   YOB: 2000   Date of Visit: 3/5/2019           To Whom it May Concern:    Marisel Ramirez was seen in my clinic on 3/5/2019. Please excuse her from school from 3/5/19 through 3/6/19.    If you have any questions or concerns, please don't hesitate to call.        Sincerely,           Murtaza Nguyễn P.A.-C.  Electronically Signed

## 2019-03-06 NOTE — PROGRESS NOTES
Subjective:   Marisel Ramirez is a 18 y.o. female who presents for Shortness of Breath and URI       Shortness of Breath   This is a new problem. Episode onset: 2 days ago. The problem occurs constantly. The problem has been gradually worsening. Associated symptoms include rhinorrhea, a sore throat, sputum production and wheezing. Pertinent negatives include no abdominal pain, chest pain, claudication, coryza, ear pain, fever, headaches, hemoptysis, leg pain, leg swelling, rash, swollen glands or vomiting. Nothing aggravates the symptoms. She has tried nothing for the symptoms. The treatment provided no relief.   URI    This is a new problem. Episode onset: 2 days ago. The problem has been gradually worsening. There has been no fever. Associated symptoms include congestion, coughing, a plugged ear sensation, rhinorrhea, a sore throat and wheezing. Pertinent negatives include no abdominal pain, chest pain, diarrhea, ear pain, headaches, nausea, rash, sinus pain, swollen glands or vomiting. Treatments tried: Robitussin. The treatment provided mild relief.     Review of Systems   Constitutional: Negative for chills and fever.   HENT: Positive for congestion, rhinorrhea and sore throat. Negative for ear discharge, ear pain and sinus pain.    Eyes: Negative for pain.   Respiratory: Positive for cough, sputum production, shortness of breath and wheezing. Negative for hemoptysis.    Cardiovascular: Negative for chest pain, palpitations, claudication and leg swelling.   Gastrointestinal: Negative for abdominal pain, constipation, diarrhea, nausea and vomiting.   Musculoskeletal: Negative for myalgias.   Skin: Negative for rash.   Neurological: Negative for dizziness and headaches.   All other systems reviewed and are negative.      PMH:  has a past medical history of Migraine.    MEDS:   Current Outpatient Prescriptions:   •  albuterol 108 (90 Base) MCG/ACT Aero Soln inhalation aerosol, Inhale 1-2 Puffs by mouth every 6  "hours as needed for Shortness of Breath., Disp: 1 Inhaler, Rfl: 1  •  predniSONE (DELTASONE) 20 MG Tab, Take 2 tablets by mouth daily for 5 days, Disp: 10 Tab, Rfl: 0  •  levonorgestrel-ethinyl estradiol (AVIANE) 0.1-20 MG-MCG per tablet, Take 1 Tab by mouth every day., Disp: 84 Tab, Rfl: 3    Current Facility-Administered Medications:   •  ipratropium-albuterol (DUONEB) nebulizer solution, 3 mL, Nebulization, Once, Murtaza Nguyễn P.A.-C.    ALLERGIES: No Known Allergies    SURGHX: History reviewed. No pertinent surgical history.    SOCHX:  reports that she has been smoking.  She has never used smokeless tobacco. She reports that she uses drugs, including Marijuana. She reports that she does not drink alcohol.    FH: Reviewed with patient, not pertinent to this visit.     Objective:   /75   Pulse (!) 140   Temp 37.6 °C (99.7 °F) (Temporal)   Resp 17   Ht 1.676 m (5' 6\")   Wt 60.3 kg (133 lb)   SpO2 88%   BMI 21.47 kg/m²   Physical Exam   Constitutional: She is oriented to person, place, and time. She appears well-developed and well-nourished. No distress.   HENT:   Head: Normocephalic and atraumatic.   Right Ear: Tympanic membrane, external ear and ear canal normal.   Left Ear: Tympanic membrane, external ear and ear canal normal.   Nose: Nose normal.   Mouth/Throat: Uvula is midline and mucous membranes are normal. Posterior oropharyngeal edema and posterior oropharyngeal erythema present. No oropharyngeal exudate.   Eyes: Pupils are equal, round, and reactive to light. Conjunctivae and EOM are normal.   Neck: Normal range of motion. Neck supple. No tracheal deviation present.   Cardiovascular: Normal rate, regular rhythm and normal heart sounds.    Pulmonary/Chest: Effort normal. No respiratory distress. She has wheezes. She has no rhonchi. She has no rales.   Musculoskeletal:   ROM normal all four extremities   Lymphadenopathy:     She has no cervical adenopathy.   Neurological: She is alert and " oriented to person, place, and time.   Skin: Skin is warm and dry.   Psychiatric: She has a normal mood and affect. Her behavior is normal. Judgment and thought content normal.   Vitals reviewed.    - POCT Rapid Strep A: negative  - POCT Influenza A/B: negative    Assessment/Plan:   1. Mild intermittent asthma with acute exacerbation  - ipratropium-albuterol (DUONEB) nebulizer solution; 3 mL by Nebulization route Once.  - albuterol 108 (90 Base) MCG/ACT Aero Soln inhalation aerosol; Inhale 1-2 Puffs by mouth every 6 hours as needed for Shortness of Breath.  Dispense: 1 Inhaler; Refill: 1  - predniSONE (DELTASONE) 20 MG Tab; Take 2 tablets by mouth daily for 5 days  Dispense: 10 Tab; Refill: 0    2. Viral upper respiratory tract infection  - POCT Rapid Strep A  - POCT Influenza A/B  - albuterol 108 (90 Base) MCG/ACT Aero Soln inhalation aerosol; Inhale 1-2 Puffs by mouth every 6 hours as needed for Shortness of Breath.  Dispense: 1 Inhaler; Refill: 1  - predniSONE (DELTASONE) 20 MG Tab; Take 2 tablets by mouth daily for 5 days  Dispense: 10 Tab; Refill: 0    - Patient expresses relief after DuoNeb treatment  - Advised to try OTC mucinex, steroid nasal spray for symptom relief  - School note given  - Advised to return if symptoms worsen or do not improve    Differential diagnosis, natural history, supportive care, and indications for immediate follow-up discussed.

## 2019-06-12 ENCOUNTER — OFFICE VISIT (OUTPATIENT)
Dept: MEDICAL GROUP | Facility: MEDICAL CENTER | Age: 19
End: 2019-06-12
Payer: COMMERCIAL

## 2019-06-12 VITALS
HEART RATE: 73 BPM | DIASTOLIC BLOOD PRESSURE: 70 MMHG | HEIGHT: 66 IN | BODY MASS INDEX: 21.53 KG/M2 | SYSTOLIC BLOOD PRESSURE: 106 MMHG | OXYGEN SATURATION: 96 % | TEMPERATURE: 98.6 F | RESPIRATION RATE: 16 BRPM | WEIGHT: 134 LBS

## 2019-06-12 DIAGNOSIS — R10.30 LOWER ABDOMINAL PAIN: ICD-10-CM

## 2019-06-12 LAB
APPEARANCE UR: CLEAR
BILIRUB UR STRIP-MCNC: NEGATIVE MG/DL
COLOR UR AUTO: YELLOW
GLUCOSE UR STRIP.AUTO-MCNC: NEGATIVE MG/DL
INT CON NEG: NORMAL
INT CON POS: NORMAL
KETONES UR STRIP.AUTO-MCNC: NEGATIVE MG/DL
LEUKOCYTE ESTERASE UR QL STRIP.AUTO: NEGATIVE
NITRITE UR QL STRIP.AUTO: NEGATIVE
PH UR STRIP.AUTO: 5.5 [PH] (ref 5–8)
POC URINE PREGNANCY TEST: NEGATIVE
PROT UR QL STRIP: NEGATIVE MG/DL
RBC UR QL AUTO: NORMAL
SP GR UR STRIP.AUTO: 1.02
UROBILINOGEN UR STRIP-MCNC: 0.2 MG/DL

## 2019-06-12 PROCEDURE — 99214 OFFICE O/P EST MOD 30 MIN: CPT | Performed by: NURSE PRACTITIONER

## 2019-06-12 PROCEDURE — 81002 URINALYSIS NONAUTO W/O SCOPE: CPT | Performed by: NURSE PRACTITIONER

## 2019-06-12 PROCEDURE — 81025 URINE PREGNANCY TEST: CPT | Performed by: NURSE PRACTITIONER

## 2019-06-12 ASSESSMENT — PATIENT HEALTH QUESTIONNAIRE - PHQ9: CLINICAL INTERPRETATION OF PHQ2 SCORE: 0

## 2019-06-12 NOTE — PROGRESS NOTES
"Subjective:     Chief Complaint   Patient presents with   • Abdominal Pain     X3 MONTHS, JUST SWITCHED BIRTH CONTROLX1 MONTH AGO     Marisel Ramirez is a 18 y.o. female here today to follow up on:    Lower abdominal pain  Patient presents today to discuss vague concerns with intermittent sharp abdominal pain with no specific trigger.  Pain can be right or left-sided, sometimes in the Suprapubic region.  Can occur when she is lying in bed, not associated with activity or specific time of the month.  Pain is 8 out of 10 at times, sharp with waxing and waning.  Tends to resolve without intervention, she has not tried any treatment other than rest.  She has not noticed any association with eating.  No dysuria, nausea, hematuria, vaginal irritation or discharge.  Denies constipation, diarrhea, blood or mucus in stool.  She does feel bloated at times  She is on an oral contraceptive, prescription just changed about a month ago by a provider at [facility].  Last pelvic exam was a year ago.  Same sexual partner for 2 years, denies concerns for STDs       Current medicines (including changes today)  Current Outpatient Prescriptions   Medication Sig Dispense Refill   • albuterol 108 (90 Base) MCG/ACT Aero Soln inhalation aerosol Inhale 1-2 Puffs by mouth every 6 hours as needed for Shortness of Breath. 1 Inhaler 1   • levonorgestrel-ethinyl estradiol (AVIANE) 0.1-20 MG-MCG per tablet Take 1 Tab by mouth every day. (Patient not taking: Reported on 6/12/2019) 84 Tab 3     No current facility-administered medications for this visit.      She  has a past medical history of Migraine.    ROS included above     Objective:     /70 (BP Location: Left arm, Patient Position: Sitting, BP Cuff Size: Adult)   Pulse 73   Temp 37 °C (98.6 °F) (Temporal)   Resp 16   Ht 1.676 m (5' 6\")   Wt 60.8 kg (134 lb)   SpO2 96%  Body mass index is 21.63 kg/m².     Physical Exam:  General: Alert, oriented in no acute distress.  Eye contact is good, " speech is normal, affect calm  Lungs: clear to auscultation bilaterally, normal effort, no wheeze/ rhonchi/ rales.  CV: regular rate and rhythm, S1, S2, no murmur  Abdomen: soft, diffuse lower abdominal tenderness without distention, no mass  Ext: no edema, color normal, vascularity normal, temperature normal    Assessment and Plan:   The following treatment plan was discussed   1. Lower abdominal pain   vague complaints of sharp lower abdominal pain with varying location, no specific trigger.  Pregnancy test negative in the office, trace blood urine sample but otherwise normal.  She denies concerns for STDs.  Will evaluate ultrasound to rule out ovarian cyst.  Patient will keep a more detailed log of symptoms, diet, exacerbating or alleviating factors, and return with this information in 2 weeks for review  POCT Urinalysis    POCT Pregnancy    US-PELVIC COMPLETE (TRANSABDOMINAL/TRANSVAGINAL) (COMBO)           Followup: 2 weeks         Please note that this dictation was created using voice recognition software. I have worked with consultants from the vendor as well as technical experts from [facility] to optimize the interface. I have made every reasonable attempt to correct obvious errors, but I expect that there are errors of grammar and possibly content that I did not discover before finalizing the note.

## 2019-06-12 NOTE — ASSESSMENT & PLAN NOTE
Patient presents today to discuss vague concerns with intermittent sharp abdominal pain with no specific trigger.  Pain can be right or left-sided, sometimes in the center.  Can occur when she is lying in bed, not associated with activity.  Pain is 8 out of 10 at times, sharp with waxing and waning.  Tends to resolve without intervention, she has not tried any treatment.  She has not noticed any association with eating.  No dysuria, nausea, hematuria, vaginal irritation or discharge.  Denies constipation, diarrhea, blood or mucus in stool.  She does feel bloated at times  She is on an oral contraceptive, prescription just changed about a month ago by a provider at [facility].  Last pelvic exam was a year ago.  Same sexual partner for 2 years, denies concerns for STDs

## 2019-06-17 ENCOUNTER — PATIENT MESSAGE (OUTPATIENT)
Dept: MEDICAL GROUP | Facility: MEDICAL CENTER | Age: 19
End: 2019-06-17

## 2019-06-27 ENCOUNTER — HOSPITAL ENCOUNTER (OUTPATIENT)
Dept: RADIOLOGY | Facility: MEDICAL CENTER | Age: 19
End: 2019-06-27
Attending: NURSE PRACTITIONER
Payer: COMMERCIAL

## 2019-06-27 DIAGNOSIS — R10.30 LOWER ABDOMINAL PAIN: ICD-10-CM

## 2019-06-27 PROCEDURE — 76830 TRANSVAGINAL US NON-OB: CPT

## 2019-06-28 ENCOUNTER — OFFICE VISIT (OUTPATIENT)
Dept: MEDICAL GROUP | Facility: MEDICAL CENTER | Age: 19
End: 2019-06-28
Payer: COMMERCIAL

## 2019-06-28 VITALS
SYSTOLIC BLOOD PRESSURE: 110 MMHG | WEIGHT: 130 LBS | HEIGHT: 66 IN | OXYGEN SATURATION: 97 % | TEMPERATURE: 98.6 F | DIASTOLIC BLOOD PRESSURE: 70 MMHG | BODY MASS INDEX: 20.89 KG/M2 | HEART RATE: 91 BPM | RESPIRATION RATE: 16 BRPM

## 2019-06-28 DIAGNOSIS — F41.9 ANXIETY: ICD-10-CM

## 2019-06-28 DIAGNOSIS — R10.30 LOWER ABDOMINAL PAIN: ICD-10-CM

## 2019-06-28 PROCEDURE — 99214 OFFICE O/P EST MOD 30 MIN: CPT | Performed by: NURSE PRACTITIONER

## 2019-06-28 NOTE — ASSESSMENT & PLAN NOTE
Anxiety came up in discussion of her abd pain. She does state that she tends to feel anxious, worried at times. Being in class makes her anxious. YAS 7 score 12 today. Not interested in medication. Does not feel anxiety causes loss of appetite, insomnia, nausea. She has tried counseling in the past but not recent. Denies family h/o anxiety/ depression

## 2019-06-28 NOTE — PROGRESS NOTES
"Subjective:     Chief Complaint   Patient presents with   • Follow-Up     Marisel Ramirez is a 18 y.o. female here today to follow up on:    Lower abdominal pain  Pt initially seen 2 weeks ago with vague complaints of lower abd pain typically occurring at night when in bed, right or left sided.  She was to take a more detailed log of symptoms and possible aggravating factors but has not been very diligent about tracking dietary triggers.  She does tell me she had an episode recently after eating taki's.  The other commonality that she has observed between episodes has been that they tend to occur at night when in bed or when in the classroom.  When questioned she does state that she is experiencing some anxiety  She did have pelvic u/s which showed 3.1 cm R ovarian cyst. L ovary not visualized    Anxiety  Anxiety came up in discussion of her abd pain. She does state that she tends to feel anxious, worried at times. Being in class makes her anxious. YAS 7 score 12 today. Not interested in medication. Does not feel anxiety causes loss of appetite, insomnia, nausea. She has tried counseling in the past but not recent. Denies family h/o anxiety/ depression       Current medicines (including changes today)  Current Outpatient Prescriptions   Medication Sig Dispense Refill   • albuterol 108 (90 Base) MCG/ACT Aero Soln inhalation aerosol Inhale 1-2 Puffs by mouth every 6 hours as needed for Shortness of Breath. 1 Inhaler 1   • levonorgestrel-ethinyl estradiol (AVIANE) 0.1-20 MG-MCG per tablet Take 1 Tab by mouth every day. 84 Tab 3     No current facility-administered medications for this visit.      She  has a past medical history of Migraine.    ROS included above     Objective:     /70 (BP Location: Left arm, Patient Position: Sitting, BP Cuff Size: Adult)   Pulse 91   Temp 37 °C (98.6 °F) (Temporal)   Resp 16   Ht 1.664 m (5' 5.5\")   Wt 59 kg (130 lb)   SpO2 97%  Body mass index is 21.3 kg/m².     Physical " Exam:  General: Alert, oriented in no acute distress.  Eye contact is good, speech is normal, affect tearful  Lungs: clear to auscultation bilaterally, normal effort, no wheeze/ rhonchi/ rales.  CV: regular rate and rhythm, S1, S2, no murmur  Abdomen: soft, mild bilateral lower abd tenderness. No distention, no CVAT  Ext: no edema, color normal, vascularity normal, temperature normal    Assessment and Plan:   The following treatment plan was discussed   1. Anxiety  New issue discussed today, she does not feel she needs medication treatment. Possibly contributing to abd pain. Encouraged counseling, phone numbers provided   2. Lower abdominal pain  Intermittent lower abd pain either R or L without clear etiology. Cyst seen on u/s, we have discussed this is common and size is not concerning. Encouraged more detailed log of events including circumstance, diet, emotional state.        Followup: as needed         Please note that this dictation was created using voice recognition software. I have worked with consultants from the vendor as well as technical experts from [facility] to optimize the interface. I have made every reasonable attempt to correct obvious errors, but I expect that there are errors of grammar and possibly content that I did not discover before finalizing the note.

## 2019-06-28 NOTE — ASSESSMENT & PLAN NOTE
Pt initially seen 2 weeks ago with vague complaints of lower abd pain typically occurring at night when in bed, right or left sided.  She was to take a more detailed log of symptoms and possible aggravating factors but has not been very diligent about tracking dietary triggers.  She does tell me she had an episode recently after eating taki's.  The other commonality that she has observed between episodes has been that they tend to occur at night when in bed or when in the classroom.  When questioned she does state that she is experiencing some anxiety  She did have pelvic u/s which showed 3.1 cm R ovarian cyst. L ovary not visualized

## 2019-06-28 NOTE — PATIENT INSTRUCTIONS
[facility] [phone]  [facility] [phone]  [facility] [phone]

## 2019-10-02 ENCOUNTER — PATIENT MESSAGE (OUTPATIENT)
Dept: MEDICAL GROUP | Facility: MEDICAL CENTER | Age: 19
End: 2019-10-02

## 2019-10-02 DIAGNOSIS — J06.9 VIRAL UPPER RESPIRATORY TRACT INFECTION: ICD-10-CM

## 2019-10-02 DIAGNOSIS — J45.21 MILD INTERMITTENT ASTHMA WITH ACUTE EXACERBATION: ICD-10-CM

## 2019-10-02 RX ORDER — ALBUTEROL SULFATE 90 UG/1
1-2 AEROSOL, METERED RESPIRATORY (INHALATION) EVERY 6 HOURS PRN
Qty: 1 INHALER | Refills: 1 | Status: SHIPPED | OUTPATIENT
Start: 2019-10-02 | End: 2020-03-09

## 2019-10-02 NOTE — TELEPHONE ENCOUNTER
From: Marisel Ramirez  To: JESSICA Jiang  Sent: 10/2/2019 12:29 PM PDT  Subject: Prescription Question    Hi Dr. Mena, I've been meaning to schedule an appointment with you about this, but kept forgetting. A while back in January I had trouble breathing that went on for about a few months. After many visits to [facility] urgent care they gave me 2 albuterol treatments within those couple months and an inhaler with 1 refill because they believe I had asthma, although needed my primary care doctor(you) to verify. I just ran out of my inhaler and am wheezing a bit. The soonest appointment you have available is Oct 23, which seems very far away. What do you advise I do?

## 2020-02-19 ENCOUNTER — OFFICE VISIT (OUTPATIENT)
Dept: MEDICAL GROUP | Facility: MEDICAL CENTER | Age: 20
End: 2020-02-19
Payer: COMMERCIAL

## 2020-02-19 VITALS
DIASTOLIC BLOOD PRESSURE: 70 MMHG | TEMPERATURE: 98.2 F | WEIGHT: 138.89 LBS | HEART RATE: 79 BPM | BODY MASS INDEX: 22.32 KG/M2 | HEIGHT: 66 IN | RESPIRATION RATE: 16 BRPM | SYSTOLIC BLOOD PRESSURE: 112 MMHG | OXYGEN SATURATION: 96 %

## 2020-02-19 DIAGNOSIS — Z00.00 ANNUAL PHYSICAL EXAM: ICD-10-CM

## 2020-02-19 DIAGNOSIS — K21.9 GASTROESOPHAGEAL REFLUX DISEASE WITHOUT ESOPHAGITIS: ICD-10-CM

## 2020-02-19 DIAGNOSIS — Z30.09 FAMILY PLANNING: ICD-10-CM

## 2020-02-19 DIAGNOSIS — Z91.09 ENVIRONMENTAL ALLERGIES: ICD-10-CM

## 2020-02-19 PROCEDURE — 99395 PREV VISIT EST AGE 18-39: CPT | Performed by: NURSE PRACTITIONER

## 2020-02-19 RX ORDER — LEVONORGESTREL AND ETHINYL ESTRADIOL 0.15-0.03
KIT ORAL DAILY
COMMUNITY
Start: 2019-12-11 | End: 2021-07-08

## 2020-02-19 RX ORDER — OMEPRAZOLE 40 MG/1
CAPSULE, DELAYED RELEASE ORAL DAILY
COMMUNITY
Start: 2020-02-04 | End: 2021-06-01 | Stop reason: SDUPTHER

## 2020-02-19 ASSESSMENT — PATIENT HEALTH QUESTIONNAIRE - PHQ9: CLINICAL INTERPRETATION OF PHQ2 SCORE: 0

## 2020-02-19 NOTE — PROGRESS NOTES
Chief Complaint   Patient presents with   • Annual Exam   • Contraception   • Sexual Problem     low sex drive      Marisel Ramirez is a 19 y.o. female here for annual exam.  She will graduate college this fall, studying marketing.  Living with a roommate.  We discussed:    Gastroesophageal reflux disease without esophagitis  Diagnosed by GI in [place], had EGD in Nov which showed gastritis, no esophagitis or ulceration.  She was started on omeprazole and reports that this is helping with her symptoms.    Family planning  Currently on Kurvelo, consistent with medication use.  She questions if OCP may be decreasing her sex drive.  Consistent relationship [id number].  She has not yet had a Pap smear    Environmental allergies  patient reports persistent congestion, sometimes postnasal drip and sneezing.  She is allergic to cats and has a pet cat at home.  She is not using any medications for allergy    Current medicines (including changes today)  Current Outpatient Medications   Medication Sig Dispense Refill   • omeprazole (PRILOSEC) 40 MG delayed-release capsule Take  by mouth every day. Take 1 capsule by mouth every day     • KURVELO 0.15-30 MG-MCG per tablet Take  by mouth every day. Take 1 tablet by mouth every day     • albuterol 108 (90 Base) MCG/ACT Aero Soln inhalation aerosol Inhale 1-2 Puffs by mouth every 6 hours as needed for Shortness of Breath. 1 Inhaler 1     No current facility-administered medications for this visit.      She  has a past medical history of Migraine.  She  has no past surgical history on file.  Social History     Tobacco Use   • Smoking status: Current Every Day Smoker   • Smokeless tobacco: Never Used   Substance Use Topics   • Alcohol use: No   • Drug use: Yes     Types: Marijuana     Social History     Social History Narrative   • Not on file     Family History   Problem Relation Age of Onset   • Hypertension Mother    • Alcohol/Drug Father      Family Status   Relation Name Status  "  • Mo  Alive   • Fa  Alive   • Bro  Alive         ROS  Problems listed discussed above, all other systems reviewed and negative     Objective:     /70 (BP Location: Left arm, Patient Position: Sitting, BP Cuff Size: Adult)   Pulse 79   Temp 36.8 °C (98.2 °F) (Temporal)   Resp 16   Ht 1.664 m (5' 5.5\")   Wt 63 kg (138 lb 14.2 oz)   SpO2 96%  Body mass index is 22.76 kg/m².  Physical Exam:  General: Alert, oriented in no acute distress.  Eye contact is good, speech is normal, affect calm  HEENT: Oral mucosa pink moist, no lesions. Nares patent. TMs gray with good landmarks bilaterally. No cervical or supraclavicular lymphadenopathy  Lungs: clear to auscultation bilaterally, good aeration, normal effort. No wheeze/ rhonchi/ rales.  CV: regular rate and rhythm, S1, S2. No murmur, no JVD, no edema. Pedal pulses 2 + bilaterally  Abdomen: soft, nontender, BS x4.  Ext: color normal, vascularity normal, temperature normal. No rash or lesions.  Neuro: DTR 2+ bilaterally  Assessment and Plan:   The following treatment plan was discussed  1. Annual physical exam  Normal physical exam.  Patient to schedule Pap smear.. General health and wellness discussion including healthy diet, regular exercise. 2000 iu Vit d3 advised daily. Advised regular dental cleanings, eye exam yearly.     2. Gastroesophageal reflux disease without esophagitis   recently diagnosed in [place], doing well on omeprazole   3. Environmental allergies   continue with sneezing and congestion.  Advised trial of over-the-counter antihistamine or Flonase   4. Family planning   generally doing well on oral contraceptive although concerned it may be impacting her libido.  Other potential contributing factors reviewed.  No medication changes today.     Followup: pt to schedule pap             Please note that this dictation was created using voice recognition software. I have worked with consultants from the vendor as well as technical experts from " [facility] to optimize the interface. I have made every reasonable attempt to correct obvious errors, but I expect that there are errors of grammar and possibly content that I did not discover before finalizing the note.

## 2020-02-19 NOTE — ASSESSMENT & PLAN NOTE
Diagnosed by GI in [place], had EGD in Nov which showed gastritis, no esophagitis or ulceration.  She was started on omeprazole and reports that this is helping with her symptoms.

## 2020-02-19 NOTE — ASSESSMENT & PLAN NOTE
patient reports persistent congestion, sometimes postnasal drip and sneezing.  She is allergic to cats and has a pet cat at home.  She is not using any medications for allergy

## 2020-02-19 NOTE — ASSESSMENT & PLAN NOTE
Currently on Kurvelo, consistent with medication use.  She questions if OCP may be decreasing her sex drive.  Consistent relationship [id number].  She has not yet had a Pap smear

## 2020-03-09 DIAGNOSIS — J45.21 MILD INTERMITTENT ASTHMA WITH ACUTE EXACERBATION: ICD-10-CM

## 2020-03-09 DIAGNOSIS — J06.9 VIRAL UPPER RESPIRATORY TRACT INFECTION: ICD-10-CM

## 2020-03-09 RX ORDER — ALBUTEROL SULFATE 90 UG/1
AEROSOL, METERED RESPIRATORY (INHALATION)
Qty: 18 G | Refills: 3 | Status: SHIPPED | OUTPATIENT
Start: 2020-03-09 | End: 2020-09-14 | Stop reason: SDUPTHER

## 2020-05-12 ENCOUNTER — APPOINTMENT (OUTPATIENT)
Dept: MEDICAL GROUP | Facility: MEDICAL CENTER | Age: 20
End: 2020-05-12
Payer: OTHER MISCELLANEOUS

## 2020-05-13 ENCOUNTER — OFFICE VISIT (OUTPATIENT)
Dept: MEDICAL GROUP | Facility: MEDICAL CENTER | Age: 20
End: 2020-05-13
Payer: COMMERCIAL

## 2020-05-13 ENCOUNTER — HOSPITAL ENCOUNTER (OUTPATIENT)
Facility: MEDICAL CENTER | Age: 20
End: 2020-05-13
Attending: NURSE PRACTITIONER
Payer: OTHER MISCELLANEOUS

## 2020-05-13 VITALS
OXYGEN SATURATION: 97 % | BODY MASS INDEX: 22.99 KG/M2 | HEART RATE: 104 BPM | HEIGHT: 65 IN | DIASTOLIC BLOOD PRESSURE: 62 MMHG | WEIGHT: 138.01 LBS | RESPIRATION RATE: 16 BRPM | SYSTOLIC BLOOD PRESSURE: 120 MMHG | TEMPERATURE: 99.6 F

## 2020-05-13 DIAGNOSIS — Z01.419 ENCOUNTER FOR GYNECOLOGICAL EXAMINATION WITHOUT ABNORMAL FINDING: ICD-10-CM

## 2020-05-13 DIAGNOSIS — Z30.09 FAMILY PLANNING: ICD-10-CM

## 2020-05-13 PROCEDURE — 99395 PREV VISIT EST AGE 18-39: CPT | Performed by: NURSE PRACTITIONER

## 2020-05-13 PROCEDURE — 88175 CYTOPATH C/V AUTO FLUID REDO: CPT

## 2020-05-13 NOTE — PROGRESS NOTES
CC:  Pap/Well Woman Exam    History of present illness:  Marisel Ramirez is 19 y.o. G0, P0 female presenting today for well woman exam with gynecological exam and Pap smear.  This is her first Pap smear.  She is doing well on an oral contraceptive, no history of DVT or clotting disorder.  Last menstrual period 1 week ago.  Same partner x2 years, no concerns for STDs.      Past Medical History:   Diagnosis Date   • Migraine        History reviewed. No pertinent surgical history.    Outpatient Encounter Medications as of 5/13/2020   Medication Sig Dispense Refill   • VENTOLIN  (90 Base) MCG/ACT Aero Soln inhalation aerosol INHALE 1 TO 2 PUFFS BY MOUTH EVERY 6 HOURS AS NEEDED FOR SHORTNESS OF BREATH 18 g 3   • omeprazole (PRILOSEC) 40 MG delayed-release capsule Take  by mouth every day. Take 1 capsule by mouth every day     • KURVELO 0.15-30 MG-MCG per tablet Take  by mouth every day. Take 1 tablet by mouth every day       No facility-administered encounter medications on file as of 5/13/2020.        Patient Active Problem List    Diagnosis Date Noted   • Gastroesophageal reflux disease without esophagitis 02/19/2020   • Environmental allergies 02/19/2020   • Anxiety 06/28/2019   • Lower abdominal pain 06/12/2019   • Cyst of right Bartholin's gland 10/03/2018   • Family planning 04/20/2018   • Dysuria 04/20/2018       .  Social History     Socioeconomic History   • Marital status: Other     Spouse name: Not on file   • Number of children: Not on file   • Years of education: Not on file   • Highest education level: Not on file   Occupational History   • Not on file   Social Needs   • Financial resource strain: Not on file   • Food insecurity     Worry: Not on file     Inability: Not on file   • Transportation needs     Medical: Not on file     Non-medical: Not on file   Tobacco Use   • Smoking status: Current Every Day Smoker   • Smokeless tobacco: Never Used   Substance and Sexual Activity   • Alcohol use: No  "  • Drug use: Yes     Types: Marijuana   • Sexual activity: Yes     Partners: Male     Birth control/protection: Pill   Lifestyle   • Physical activity     Days per week: Not on file     Minutes per session: Not on file   • Stress: Not on file   Relationships   • Social connections     Talks on phone: Not on file     Gets together: Not on file     Attends Faith service: Not on file     Active member of club or organization: Not on file     Attends meetings of clubs or organizations: Not on file     Relationship status: Not on file   • Intimate partner violence     Fear of current or ex partner: Not on file     Emotionally abused: Not on file     Physically abused: Not on file     Forced sexual activity: Not on file   Other Topics Concern   • Not on file   Social History Narrative   • Not on file       Family History   Problem Relation Age of Onset   • Hypertension Mother    • Alcohol/Drug Father          ROS:  Denies Weight loss, fatigue, chest pain, SOB, bowel or bladder changes. No significant dysmenorrhea, concerning vaginal discharge or irritation, no dyspareunia or postcoital bleeding. Denies h/o migraine with aura. Denies musculoskeletal, neurological, or psychiatric problems.      /62 (BP Location: Left arm, Patient Position: Sitting, BP Cuff Size: Adult)   Pulse (!) 104   Temp 37.6 °C (99.6 °F) (Temporal)   Resp 16   Ht 1.651 m (5' 5\")   Wt 62.6 kg (138 lb 0.1 oz)   SpO2 97%   BMI 22.97 kg/m²     GEN:  Appears well and in no apparent distress   NECK:  Supple without adenopathy or thyromegaly  LUNGS:  Clear and equal. No wheeze, ronchi, or rales.  CV:  RRR, S1, S2. No murmur.  Pedal pulses 2+ bilaterally.  BREAST:  Symmetrical without masses. No nipple discharge.  ABD:  Soft, non-tender, non-distended, normal bowel sounds.  No hepatosplenomegaly.  :  Normal external female genitalia.  Vaginal canal clear.  Cervix appears normal. Specimen collected from transformation zone. Bimanual exam:  " No CMT, normal size uterus without masses or tenderness; no adnexal masses or tenderness.      Assessment and plan    1. Encounter for gynecological examination without abnormal finding  .Normal GYN exam. Pap sent, follow-up pending results. Breast self-exam taught and encouraged monthly. General health and wellness discussion including healthy diet, regular exercise. 2000 international units of vitamin D3 advise daily. Preventative health screenings     - THINPREP PAP, REFLEX HPV ON ASC-US AND ABOVE; Future    2. Family planning  Stable, continue OCP      F/u pending results

## 2020-05-14 DIAGNOSIS — Z01.419 ENCOUNTER FOR GYNECOLOGICAL EXAMINATION WITHOUT ABNORMAL FINDING: ICD-10-CM

## 2020-05-14 LAB — CYTOLOGY REG CYTOL: NORMAL

## 2020-09-14 DIAGNOSIS — J06.9 VIRAL UPPER RESPIRATORY TRACT INFECTION: ICD-10-CM

## 2020-09-14 DIAGNOSIS — J45.21 MILD INTERMITTENT ASTHMA WITH ACUTE EXACERBATION: ICD-10-CM

## 2020-09-14 RX ORDER — ALBUTEROL SULFATE 90 UG/1
1-2 AEROSOL, METERED RESPIRATORY (INHALATION) EVERY 4 HOURS PRN
Qty: 18 G | Refills: 3 | Status: SHIPPED | OUTPATIENT
Start: 2020-09-14 | End: 2021-06-14

## 2020-09-14 NOTE — TELEPHONE ENCOUNTER
Received request via: Pharmacy    Was the patient seen in the last year in this department? Yes    Does the patient have an active prescription (recently filled or refills available) for medication(s) requested? Last written in March, last refill [facility] 6/14

## 2020-09-29 ENCOUNTER — HOSPITAL ENCOUNTER (OUTPATIENT)
Facility: MEDICAL CENTER | Age: 20
End: 2020-09-29
Attending: NURSE PRACTITIONER
Payer: OTHER MISCELLANEOUS

## 2020-09-29 ENCOUNTER — HOSPITAL ENCOUNTER (OUTPATIENT)
Facility: MEDICAL CENTER | Age: 20
End: 2020-09-29
Attending: NURSE PRACTITIONER
Payer: COMMERCIAL

## 2020-09-29 ENCOUNTER — OFFICE VISIT (OUTPATIENT)
Dept: MEDICAL GROUP | Facility: MEDICAL CENTER | Age: 20
End: 2020-09-29
Payer: COMMERCIAL

## 2020-09-29 VITALS
HEART RATE: 96 BPM | RESPIRATION RATE: 17 BRPM | OXYGEN SATURATION: 100 % | TEMPERATURE: 98.8 F | SYSTOLIC BLOOD PRESSURE: 126 MMHG | DIASTOLIC BLOOD PRESSURE: 60 MMHG | BODY MASS INDEX: 21.61 KG/M2 | HEIGHT: 66 IN | WEIGHT: 134.48 LBS

## 2020-09-29 DIAGNOSIS — Z11.3 SCREENING EXAMINATION FOR STD (SEXUALLY TRANSMITTED DISEASE): ICD-10-CM

## 2020-09-29 DIAGNOSIS — L20.84 INTRINSIC ECZEMA: ICD-10-CM

## 2020-09-29 DIAGNOSIS — F43.21 GRIEF REACTION: ICD-10-CM

## 2020-09-29 PROCEDURE — 99213 OFFICE O/P EST LOW 20 MIN: CPT | Performed by: NURSE PRACTITIONER

## 2020-09-29 PROCEDURE — 87591 N.GONORRHOEAE DNA AMP PROB: CPT

## 2020-09-29 PROCEDURE — 87510 GARDNER VAG DNA DIR PROBE: CPT

## 2020-09-29 PROCEDURE — 87660 TRICHOMONAS VAGIN DIR PROBE: CPT

## 2020-09-29 PROCEDURE — 87491 CHLMYD TRACH DNA AMP PROBE: CPT

## 2020-09-29 PROCEDURE — 87480 CANDIDA DNA DIR PROBE: CPT

## 2020-09-29 RX ORDER — PIMECROLIMUS 10 MG/G
2-4 CREAM TOPICAL 2 TIMES DAILY
Qty: 30 G | Refills: 3 | Status: SHIPPED | OUTPATIENT
Start: 2020-09-29 | End: 2021-06-01

## 2020-09-29 NOTE — ASSESSMENT & PLAN NOTE
States that her dad passed away suddenly earlier this year from complications related to a bleeding ulcer.  She was very busy at the time and does not feel that she dealt with her grief, states that she now feels that it is catching up to her.  She is having more difficulty with tearfulness.  She would like to meet with a therapist.  She does not feel that she needs medication assistance at this time

## 2020-09-29 NOTE — ASSESSMENT & PLAN NOTE
She is in a new relationship and has been sexually active, she would like STD screening.  No vaginal itching, burning, discharge, no pelvic pain

## 2020-09-29 NOTE — PROGRESS NOTES
"Subjective:     Chief Complaint   Patient presents with   • Medication Refill   • Sexually Transmitted Diseases     new partner     Marisel Ramirez is a 20 y.o. female here today to follow up on:    Grief reaction  States that her dad passed away suddenly earlier this year from complications related to a bleeding ulcer.  She was very busy at the time and does not feel that she dealt with her grief, states that she now feels that it is catching up to her.  She is having more difficulty with tearfulness.  She would like to meet with a therapist.  She does not feel that she needs medication assistance at this time    Intrinsic eczema  Using Elidel cream for the face which is working well, needing refill today    Screening examination for STD (sexually transmitted disease)  She is in a new relationship and has been sexually active, she would like STD screening.  No vaginal itching, burning, discharge, no pelvic pain       Current medicines (including changes today)  Current Outpatient Medications   Medication Sig Dispense Refill   • pimecrolimus (ELIDEL) 1 % cream Apply 2-4 g to affected area(s) 2 times a day. 30 g 3   • VENTOLIN  (90 Base) MCG/ACT Aero Soln inhalation aerosol Inhale 1-2 Puffs by mouth every four hours as needed for Shortness of Breath. INHALE 1 TO 2 PUFFS BY MOUTH EVERY 6 HOURS AS NEEDED FOR SHORTNESS OF BREATH 18 g 3   • omeprazole (PRILOSEC) 40 MG delayed-release capsule Take  by mouth every day. Take 1 capsule by mouth every day     • KURVELO 0.15-30 MG-MCG per tablet Take  by mouth every day. Take 1 tablet by mouth every day       No current facility-administered medications for this visit.      She  has a past medical history of Migraine.    ROS included above     Objective:     /60 (BP Location: Left arm, Patient Position: Sitting, BP Cuff Size: Adult)   Pulse 96   Temp 37.1 °C (98.8 °F) (Temporal)   Resp 17   Ht 1.676 m (5' 6\")   Wt 61 kg (134 lb 7.7 oz)   SpO2 100%  Body mass " index is 21.71 kg/m².     Physical Exam:  General: Alert, oriented in no acute distress.  Eye contact is good, speech is normal, affect tearful  Lungs: clear to auscultation bilaterally, normal effort, no wheeze/ rhonchi/ rales.  CV: regular rate and rhythm, S1, S2, no murmur  Ext: no edema, color normal, vascularity normal, temperature normal    Assessment and Plan:   The following treatment plan was discussed   1. Intrinsic eczema   stable  pimecrolimus (ELIDEL) 1 % cream   2. Screening examination for STD (sexually transmitted disease)   denies symptoms or known exposure, screening tests ordered  HIV AG/AB COMBO ASSAY SCREENING    HEP C VIRUS ANTIBODY    TREPONEMA PALLIDUM ANTIBODIES    Chlamydia/GC PCR Urine Or Swab    VAGINAL PATHOGENS DNA PANEL   3. Grief reaction   phone numbers for counseling provided       Followup: pending labs         Please note that this dictation was created using voice recognition software. I have worked with consultants from the vendor as well as technical experts from [facility] [employer] to optimize the interface. I have made every reasonable attempt to correct obvious errors, but I expect that there are errors of grammar and possibly content that I did not discover before finalizing the note.

## 2020-09-30 LAB
C TRACH DNA SPEC QL NAA+PROBE: NEGATIVE
CANDIDA DNA VAG QL PROBE+SIG AMP: POSITIVE
G VAGINALIS DNA VAG QL PROBE+SIG AMP: NEGATIVE
N GONORRHOEA DNA SPEC QL NAA+PROBE: NEGATIVE
SPECIMEN SOURCE: NORMAL
T VAGINALIS DNA VAG QL PROBE+SIG AMP: NEGATIVE

## 2020-09-30 RX ORDER — FLUCONAZOLE 150 MG/1
150 TABLET ORAL ONCE
Qty: 1 TAB | Refills: 0 | Status: SHIPPED | OUTPATIENT
Start: 2020-09-30 | End: 2020-09-30

## 2020-10-25 ENCOUNTER — PATIENT MESSAGE (OUTPATIENT)
Dept: MEDICAL GROUP | Facility: MEDICAL CENTER | Age: 20
End: 2020-10-25

## 2020-10-25 DIAGNOSIS — B37.31 YEAST VAGINITIS: ICD-10-CM

## 2020-10-25 RX ORDER — METRONIDAZOLE 7.5 MG/G
1 GEL VAGINAL
Qty: 70 G | Refills: 0 | Status: SHIPPED | OUTPATIENT
Start: 2020-10-25 | End: 2020-10-30

## 2020-10-26 NOTE — TELEPHONE ENCOUNTER
From: Marisel Ramirez  To: JESSICA Jiang  Sent: 10/25/2020 4:55 PM PDT  Subject: Test Result Question    Rishabh Padilla. It seems I'm getting frequent yeast infections because I still don't feel completely better. Is it possible that I could get a blood test or another pee test? And perhaps a recommendation to a gynecologist because it seems to be a frequent issue I need to figure out how to manage. Thank you.

## 2021-02-10 ENCOUNTER — OFFICE VISIT (OUTPATIENT)
Dept: URGENT CARE | Facility: CLINIC | Age: 21
End: 2021-02-10
Payer: COMMERCIAL

## 2021-02-12 ENCOUNTER — HOSPITAL ENCOUNTER (OUTPATIENT)
Facility: MEDICAL CENTER | Age: 21
End: 2021-02-12
Attending: PHYSICIAN ASSISTANT
Payer: COMMERCIAL

## 2021-02-12 ENCOUNTER — OFFICE VISIT (OUTPATIENT)
Dept: URGENT CARE | Facility: PHYSICIAN GROUP | Age: 21
End: 2021-02-12
Payer: COMMERCIAL

## 2021-02-12 VITALS
SYSTOLIC BLOOD PRESSURE: 96 MMHG | DIASTOLIC BLOOD PRESSURE: 70 MMHG | TEMPERATURE: 98.8 F | HEIGHT: 65 IN | WEIGHT: 138 LBS | HEART RATE: 90 BPM | RESPIRATION RATE: 16 BRPM | OXYGEN SATURATION: 95 % | BODY MASS INDEX: 22.99 KG/M2

## 2021-02-12 DIAGNOSIS — R30.0 DYSURIA: ICD-10-CM

## 2021-02-12 DIAGNOSIS — N76.0 ACUTE VAGINITIS: ICD-10-CM

## 2021-02-12 PROBLEM — B37.31 CANDIDAL VULVOVAGINITIS: Status: ACTIVE | Noted: 2020-11-03

## 2021-02-12 LAB
APPEARANCE UR: NORMAL
BILIRUB UR STRIP-MCNC: NEGATIVE MG/DL
CANDIDA DNA VAG QL PROBE+SIG AMP: NEGATIVE
COLOR UR AUTO: YELLOW
G VAGINALIS DNA VAG QL PROBE+SIG AMP: POSITIVE
GLUCOSE UR STRIP.AUTO-MCNC: NEGATIVE MG/DL
INT CON NEG: NEGATIVE
INT CON POS: POSITIVE
KETONES UR STRIP.AUTO-MCNC: NEGATIVE MG/DL
LEUKOCYTE ESTERASE UR QL STRIP.AUTO: NEGATIVE
NITRITE UR QL STRIP.AUTO: NEGATIVE
PH UR STRIP.AUTO: 7.5 [PH] (ref 5–8)
POC URINE PREGNANCY TEST: NEGATIVE
PROT UR QL STRIP: NEGATIVE MG/DL
RBC UR QL AUTO: NEGATIVE
SP GR UR STRIP.AUTO: 1.02
T VAGINALIS DNA VAG QL PROBE+SIG AMP: NEGATIVE
UROBILINOGEN UR STRIP-MCNC: 0.2 MG/DL

## 2021-02-12 PROCEDURE — 81002 URINALYSIS NONAUTO W/O SCOPE: CPT | Performed by: PHYSICIAN ASSISTANT

## 2021-02-12 PROCEDURE — 99214 OFFICE O/P EST MOD 30 MIN: CPT | Performed by: PHYSICIAN ASSISTANT

## 2021-02-12 PROCEDURE — 87086 URINE CULTURE/COLONY COUNT: CPT

## 2021-02-12 PROCEDURE — 87510 GARDNER VAG DNA DIR PROBE: CPT

## 2021-02-12 PROCEDURE — 87660 TRICHOMONAS VAGIN DIR PROBE: CPT

## 2021-02-12 PROCEDURE — 81025 URINE PREGNANCY TEST: CPT | Performed by: PHYSICIAN ASSISTANT

## 2021-02-12 PROCEDURE — 87480 CANDIDA DNA DIR PROBE: CPT

## 2021-02-12 RX ORDER — AZITHROMYCIN 1 G/1
1 POWDER, FOR SUSPENSION ORAL ONCE
COMMUNITY
End: 2021-06-01

## 2021-02-12 RX ORDER — FLUCONAZOLE 150 MG/1
TABLET ORAL
COMMUNITY
Start: 2020-11-03 | End: 2021-06-01

## 2021-02-12 ASSESSMENT — ENCOUNTER SYMPTOMS
NAUSEA: 0
DIARRHEA: 0
VOMITING: 0
FLANK PAIN: 0

## 2021-02-13 NOTE — PROGRESS NOTES
"Subjective:   Marisel Ramirez  is a 20 y.o. female who presents for UTI (Pt reports disuria, frequency, urgency, foul odor. Onset 4 days. )          Patient identity confirmed using two patient identifiers of last name and date of birth.      UTI      ROS  No Known Allergies  Reviewed past medical, surgical , social and family history.  Reviewed prescription and over-the-counter medications with patient and electronic health record today.     Objective:   BP (!) 96/70 (BP Location: Left arm, Patient Position: Sitting, BP Cuff Size: Small adult)   Pulse 90   Temp 37.1 °C (98.8 °F) (Temporal)   Resp 16   Ht 1.651 m (5' 5\")   Wt 62.6 kg (138 lb)   SpO2 95%   BMI 22.96 kg/m²   Physical Exam      Assessment/Plan:   There are no diagnoses linked to this encounter.   Differential diagnosis, natural history, supportive care, and indications for immediate follow-up discussed.     Red flag warning symptoms and strict ER/follow-up precautions given.  The patient demonstrated a good understanding and agreed with the treatment plan.  Please note that this note was created using voice recognition speech to text software. Every effort has been made to correct obvious errors.  However, I expect there are errors of grammar and possibly context that were not discovered prior to finalizing the note  BARRY Melchor PA-C    "

## 2021-02-13 NOTE — PROGRESS NOTES
"Subjective:     Marisel Ramirez  is a 20 y.o. female who presents for UTI (urinary frequency and urgency, vaginal discharge)    Patient identity confirmed using two patient identifiers of last name and date of birth.    Patient presents to urgent care with concern for UTI.  Patient reports 4-day history of mild urgency and frequency with urination.  She denies any true dysuria, abdominal pain, flank pain, hematuria, fever or chills.  Patient has also been noting a mild vaginal discharge with a \"vegetable type odor\".  Patient has had a recent new sexual partner, unprotected.  She has had testing performed for chlamydia and gonorrhea since her sexual encounter and tested negative per her report.    Patient admits that she has been struggling for many months with issues with recurrent yeast infections or bacterial vaginosis.  She was treated by provider in [facility] for ongoing yeast infection with oral agents and topical agents to no avail and was ultimately placed on oral nystatin for 2-month course of which she is currently taking.     UTI  Pertinent negatives include no nausea or vomiting.         Review of Systems   Gastrointestinal: Negative for diarrhea, nausea and vomiting.   Genitourinary: Positive for frequency and urgency. Negative for dysuria, flank pain and hematuria.   All other systems reviewed and are negative.    No Known Allergies  Past medical history, family history, surgical history and social history are reviewed and updated in the record today.     Objective:   BP (!) 96/70 (BP Location: Left arm, Patient Position: Sitting, BP Cuff Size: Small adult)   Pulse 90   Temp 37.1 °C (98.8 °F) (Temporal)   Resp 16   Ht 1.651 m (5' 5\")   Wt 62.6 kg (138 lb)   SpO2 95%   BMI 22.96 kg/m²   Physical Exam  Vitals reviewed.   Constitutional:       General: She is not in acute distress.     Appearance: She is well-developed. She is not ill-appearing or toxic-appearing.   HENT:      Head: Normocephalic " and atraumatic.      Right Ear: External ear normal.      Left Ear: External ear normal.      Nose: Nose normal.      Mouth/Throat:      Lips: Pink. No lesions.      Mouth: Mucous membranes are moist.      Pharynx: Oropharynx is clear. Uvula midline. No oropharyngeal exudate.   Eyes:      General: Lids are normal.      Extraocular Movements: Extraocular movements intact.      Conjunctiva/sclera: Conjunctivae normal.      Pupils: Pupils are equal, round, and reactive to light.   Cardiovascular:      Rate and Rhythm: Normal rate and regular rhythm.      Heart sounds: Normal heart sounds. No murmur. No friction rub. No gallop.    Pulmonary:      Effort: Pulmonary effort is normal. No respiratory distress.      Breath sounds: Normal breath sounds.   Abdominal:      General: Bowel sounds are normal. There is no distension.      Palpations: Abdomen is soft. There is no mass.      Tenderness: There is no abdominal tenderness. There is no right CVA tenderness, left CVA tenderness, guarding or rebound.   Musculoskeletal:         General: No tenderness or deformity. Normal range of motion.      Cervical back: Normal range of motion and neck supple.   Lymphadenopathy:      Head:      Right side of head: No submental, submandibular or tonsillar adenopathy.      Left side of head: No submental, submandibular or tonsillar adenopathy.      Cervical: No cervical adenopathy.      Upper Body:      Right upper body: No supraclavicular adenopathy.      Left upper body: No supraclavicular adenopathy.   Skin:     General: Skin is warm and dry.      Findings: No rash.   Neurological:      Mental Status: She is alert and oriented to person, place, and time.      Cranial Nerves: Cranial nerves are intact. No cranial nerve deficit.      Sensory: Sensation is intact. No sensory deficit.      Motor: Motor function is intact.      Coordination: Coordination is intact. Coordination normal.      Gait: Gait is intact.   Psychiatric:          Attention and Perception: Attention normal.         Mood and Affect: Mood and affect normal.         Speech: Speech normal.         Behavior: Behavior normal. Behavior is cooperative.         Thought Content: Thought content normal.         Judgment: Judgment normal.          Assessment/Plan:   1. Dysuria  - POCT Urinalysis  - POCT Pregnancy  - URINE CULTURE(NEW); Future    2. Acute vaginitis  - POCT Urinalysis  - POCT Pregnancy  - VAGINAL PATHOGENS DNA PANEL; Future      Results for orders placed or performed in visit on 02/12/21   POCT Urinalysis   Result Value Ref Range    POC Color YELLOW Negative    POC Appearance TURBID Negative    POC Leukocyte Esterase NEGATIVE Negative    POC Nitrites NEGATIVE Negative    POC Urobiligen 0.2 Negative (0.2) mg/dL    POC Protein NEGATIVE Negative mg/dL    POC Urine PH 7.5 5.0 - 8.0    POC Blood NEGATIVE Negative    POC Specific Gravity 1.025 <1.005 - >1.030    POC Ketones NEGATIVE Negative mg/dL    POC Bilirubin NEGATIVE Negative mg/dL    POC Glucose NEGATIVE Negative mg/dL   POCT Pregnancy   Result Value Ref Range    POC Urine Pregnancy Test NEGATIVE Negative    Internal Control Positive Positive     Internal Control Negative Negative        Urinalysis is not suspicious for UTI.  However, given the patient's symptoms and her concern we will go ahead and send a urine culture for confirmation but hold off on treatment pending urine culture.  If urine culture begins to show any concerning growth we will contact the patient and start her on medication.    Offered pelvic exam versus self swab for vaginal pathogens.  Patient desires to self swab.  Instructions are provided for vaginal swab.  Patient be contacted with results once available.    Patient does inquire if she should continue long-term nystatin.  I reviewed with the patient that if her testing today is negative I would recommend discontinuation.    Upon entering exam room I ensured patient was wearing a mask.  This  provider wore appropriate PPE throughout entire visit.  Patient wore mask entire visit except for a brief period while examining oropharynx.      Differential diagnosis, natural history, supportive care, and indications for immediate follow-up discussed.  Red flag warning symptoms and strict ER/follow-up precautions given.  The patient demonstrated a good understanding and agreed with the treatment plan.  Please note that this note was created using voice recognition speech to text software. Every effort has been made to correct obvious errors.  However, I expect there are errors of grammar and possibly context that were not discovered prior to finalizing the note  BARRY Melchor PA-C

## 2021-02-15 ENCOUNTER — TELEPHONE (OUTPATIENT)
Dept: URGENT CARE | Facility: PHYSICIAN GROUP | Age: 21
End: 2021-02-15

## 2021-02-15 DIAGNOSIS — N76.0 BACTERIAL VAGINOSIS: ICD-10-CM

## 2021-02-15 DIAGNOSIS — B96.89 BACTERIAL VAGINOSIS: ICD-10-CM

## 2021-02-15 LAB
BACTERIA UR CULT: NORMAL
SIGNIFICANT IND 70042: NORMAL
SITE SITE: NORMAL
SOURCE SOURCE: NORMAL

## 2021-02-15 RX ORDER — METRONIDAZOLE 7.5 MG/G
1 GEL VAGINAL
Qty: 5 EACH | Refills: 0 | Status: SHIPPED | OUTPATIENT
Start: 2021-02-15 | End: 2021-02-20

## 2021-02-15 NOTE — TELEPHONE ENCOUNTER
Attempted to contact patient with test results positive for bacterial vaginosis.  Voicemail left negative for candidiasis.  Urine culture with preliminary result without evidence of UTI at this time.  Prescription for MetroGel vaginal sent to [facility] in [place].  Callback number provided to call with additional questions.  Itzel Melchor PA-C

## 2021-02-22 ENCOUNTER — OFFICE VISIT (OUTPATIENT)
Dept: MEDICAL GROUP | Facility: MEDICAL CENTER | Age: 21
End: 2021-02-22
Payer: COMMERCIAL

## 2021-02-22 ENCOUNTER — HOSPITAL ENCOUNTER (OUTPATIENT)
Facility: MEDICAL CENTER | Age: 21
End: 2021-02-22
Attending: NURSE PRACTITIONER
Payer: COMMERCIAL

## 2021-02-22 VITALS
DIASTOLIC BLOOD PRESSURE: 76 MMHG | TEMPERATURE: 98.8 F | BODY MASS INDEX: 22.66 KG/M2 | HEART RATE: 66 BPM | WEIGHT: 140.98 LBS | SYSTOLIC BLOOD PRESSURE: 110 MMHG | OXYGEN SATURATION: 95 % | RESPIRATION RATE: 18 BRPM | HEIGHT: 66 IN

## 2021-02-22 DIAGNOSIS — Z23 NEED FOR VACCINATION: ICD-10-CM

## 2021-02-22 DIAGNOSIS — N89.8 VAGINAL LESION: ICD-10-CM

## 2021-02-22 DIAGNOSIS — Z12.4 CERVICAL CANCER SCREENING: ICD-10-CM

## 2021-02-22 DIAGNOSIS — Z11.3 SCREENING EXAMINATION FOR STD (SEXUALLY TRANSMITTED DISEASE): ICD-10-CM

## 2021-02-22 DIAGNOSIS — E01.0 THYROMEGALY: ICD-10-CM

## 2021-02-22 DIAGNOSIS — Z11.3 SCREEN FOR STD (SEXUALLY TRANSMITTED DISEASE): ICD-10-CM

## 2021-02-22 LAB
AMBIGUOUS DTTM AMBI4: NORMAL
CANDIDA DNA VAG QL PROBE+SIG AMP: NEGATIVE
G VAGINALIS DNA VAG QL PROBE+SIG AMP: NEGATIVE
T VAGINALIS DNA VAG QL PROBE+SIG AMP: NEGATIVE

## 2021-02-22 PROCEDURE — 87591 N.GONORRHOEAE DNA AMP PROB: CPT

## 2021-02-22 PROCEDURE — 87660 TRICHOMONAS VAGIN DIR PROBE: CPT

## 2021-02-22 PROCEDURE — 87510 GARDNER VAG DNA DIR PROBE: CPT

## 2021-02-22 PROCEDURE — 87624 HPV HI-RISK TYP POOLED RSLT: CPT

## 2021-02-22 PROCEDURE — 87491 CHLMYD TRACH DNA AMP PROBE: CPT

## 2021-02-22 PROCEDURE — 87480 CANDIDA DNA DIR PROBE: CPT

## 2021-02-22 PROCEDURE — 88175 CYTOPATH C/V AUTO FLUID REDO: CPT

## 2021-02-22 PROCEDURE — 90651 9VHPV VACCINE 2/3 DOSE IM: CPT | Performed by: NURSE PRACTITIONER

## 2021-02-22 PROCEDURE — 99213 OFFICE O/P EST LOW 20 MIN: CPT | Mod: 25 | Performed by: NURSE PRACTITIONER

## 2021-02-22 PROCEDURE — 90471 IMMUNIZATION ADMIN: CPT | Performed by: NURSE PRACTITIONER

## 2021-02-22 ASSESSMENT — PATIENT HEALTH QUESTIONNAIRE - PHQ9: CLINICAL INTERPRETATION OF PHQ2 SCORE: 0

## 2021-02-22 NOTE — ASSESSMENT & PLAN NOTE
This is a new concern for evaluation today.  She feels that there is a lump in her throat or that the area is swollen.  She was seen and treated for strep at [facility] recently, feels that this is resolved.  Sometimes has a hard time swallowing.  She thinks that her mother has a thyroid disorder.  She denies any congestion, otalgia, fever, chills, weight changes, heat or cold intolerance

## 2021-02-22 NOTE — PROGRESS NOTES
Subjective:     Chief Complaint   Patient presents with   • Other     lesions in genital area     Marisel Ramirez is a 20 y.o. female here today to follow up on:    Screening examination for STD (sexually transmitted disease)  States that she has had a new partner recently, not using condoms.  She is also stopped her birth control.  Last menstrual period 3 weeks ago.  She does not want pregnancy.  She would like routine STD screening, denies specific symptoms including pelvic pain, vaginal discharge    She is also requesting Pap smear.  States that she feels some bumps at the vaginal introitus and is concerned that this could be related to HPV.  She is unsure if she received the HPV vaccine, we do not have record of this  Her last Pap in 2020 was normal    Thyromegaly  This is a new concern for evaluation today.  She feels that there is a lump in her throat or that the area is swollen.  She was seen and treated for strep at [facility] recently, feels that this is resolved.  Sometimes has a hard time swallowing.  She thinks that her mother has a thyroid disorder.  She denies any congestion, otalgia, fever, chills, weight changes, heat or cold intolerance       Current medicines (including changes today)  Current Outpatient Medications   Medication Sig Dispense Refill   • pimecrolimus (ELIDEL) 1 % cream Apply 2-4 g to affected area(s) 2 times a day. 30 g 3   • VENTOLIN  (90 Base) MCG/ACT Aero Soln inhalation aerosol Inhale 1-2 Puffs by mouth every four hours as needed for Shortness of Breath. INHALE 1 TO 2 PUFFS BY MOUTH EVERY 6 HOURS AS NEEDED FOR SHORTNESS OF BREATH 18 g 3   • azithromycin (ZITHROMAX) 1 GM powder Take 1 Packet by mouth one time.     • fluconazole (DIFLUCAN) 150 MG tablet FLUCONAZOLE 150 MG TABS     • omeprazole (PRILOSEC) 40 MG delayed-release capsule Take  by mouth every day. Take 1 capsule by mouth every day     • KURVELO 0.15-30 MG-MCG per tablet Take  by mouth every day. Take 1 tablet by mouth  "every day       No current facility-administered medications for this visit.     She  has a past medical history of Migraine.    ROS included above     Objective:     /76 (BP Location: Left arm, Patient Position: Sitting, BP Cuff Size: Adult)   Pulse 66   Temp 37.1 °C (98.8 °F) (Temporal)   Resp 18   Ht 1.685 m (5' 6.34\")   Wt 64 kg (140 lb 15.8 oz)   SpO2 95%  Body mass index is 22.52 kg/m².     Physical Exam:  General: Alert, oriented in no acute distress.  Eye contact is good, speech is normal, affect calm  Lungs: clear to auscultation bilaterally, normal effort, no wheeze/ rhonchi/ rales.  CV: regular rate and rhythm, S1, S2, no murmur  Pelvic: external genitalia pink, moist.  Cluster of tiny papular lesions at the vaginal introitus at 6:00. Vaginal canal with scant white discharge. Cervical os clear, no visible lesions. Specimen collected from transformation zone. Bimanual: uterus small, midline, no CMT, no adnexal masses or tenderness.  Ext: no edema, color normal, vascularity normal, temperature normal    Assessment and Plan:   The following treatment plan was discussed   1. Thyromegaly   thyroid appears enlarged without obvious nodule or mass.  Will evaluate ultrasound and labs as listed below  TSH WITH REFLEX TO FT4    THYROID PEROXIDASE  (TPO) AB    US-THYROID   2. Vaginal lesion   she does have some tiny papules at 6:00 to the vaginal introitus, possibly warts.  We will follow-up pending test results  THINPREP PAP WITH HPV   3. Screen for STD (sexually transmitted disease)   requesting screening, asymptomatic.  Safe sexual practices discussed  Chlamydia/GC PCR Urine Or Swab    VAGINAL PATHOGENS DNA PANEL   4. Cervical cancer screening  THINPREP PAP WITH HPV   5. Screening examination for STD (sexually transmitted disease)     6. Need for vaccination  I have placed the below orders and discussed them with an approved delegating provider. The MA is performing the below orders under the direction " of Dr. Humberto Ashby 9       Followup: pending tests         Please note that this dictation was created using voice recognition software. I have worked with consultants from the vendor as well as technical experts from [facility] to optimize the interface. I have made every reasonable attempt to correct obvious errors, but I expect that there are errors of grammar and possibly content that I did not discover before finalizing the note.

## 2021-02-22 NOTE — ASSESSMENT & PLAN NOTE
States that she has had a new partner recently, not using condoms.  She is also stopped her birth control.  Last menstrual period 3 weeks ago.  She does not want pregnancy.  She would like routine STD screening, denies specific symptoms including pelvic pain, vaginal discharge    She is also requesting Pap smear.  States that she feels some bumps at the vaginal introitus and is concerned that this could be related to HPV.  She is unsure if she received the HPV vaccine, we do not have record of this  Her last Pap in 2020 was normal

## 2021-02-23 LAB
C TRACH DNA SPEC QL NAA+PROBE: NEGATIVE
CYTOLOGY REG CYTOL: ABNORMAL
HPV HR 12 DNA CVX QL NAA+PROBE: POSITIVE
HPV16 DNA SPEC QL NAA+PROBE: NEGATIVE
HPV18 DNA SPEC QL NAA+PROBE: NEGATIVE
N GONORRHOEA DNA SPEC QL NAA+PROBE: NEGATIVE
SPECIMEN SOURCE: ABNORMAL
SPECIMEN SOURCE: NORMAL

## 2021-02-24 DIAGNOSIS — B37.31 YEAST VAGINITIS: ICD-10-CM

## 2021-02-24 DIAGNOSIS — A63.0 GENITAL WARTS DUE TO HPV (HUMAN PAPILLOMAVIRUS): ICD-10-CM

## 2021-02-24 RX ORDER — FLUCONAZOLE 150 MG/1
150 TABLET ORAL DAILY
Qty: 1 TABLET | Refills: 0 | Status: SHIPPED | OUTPATIENT
Start: 2021-02-24 | End: 2021-06-01

## 2021-03-02 ENCOUNTER — PATIENT MESSAGE (OUTPATIENT)
Dept: MEDICAL GROUP | Facility: MEDICAL CENTER | Age: 21
End: 2021-03-02

## 2021-03-02 LAB
THYROPEROXIDASE AB SERPL-ACNC: 30 IU/ML (ref 0–34)
TSH SERPL DL<=0.005 MIU/L-ACNC: 1.84 UIU/ML (ref 0.45–4.5)

## 2021-03-03 ENCOUNTER — HOSPITAL ENCOUNTER (OUTPATIENT)
Dept: RADIOLOGY | Facility: MEDICAL CENTER | Age: 21
End: 2021-03-03
Attending: NURSE PRACTITIONER
Payer: COMMERCIAL

## 2021-03-03 ENCOUNTER — PATIENT MESSAGE (OUTPATIENT)
Dept: MEDICAL GROUP | Facility: MEDICAL CENTER | Age: 21
End: 2021-03-03

## 2021-03-03 DIAGNOSIS — E01.0 THYROMEGALY: ICD-10-CM

## 2021-03-03 PROCEDURE — 76536 US EXAM OF HEAD AND NECK: CPT

## 2021-03-04 NOTE — TELEPHONE ENCOUNTER
From: Marisel Ramirez  To: Nurse Practicioner Valerie Mena  Sent: 3/3/2021 4:11 PM PST  Subject: Non-Urgent Medical Question    Okay, I completed my ultrasound today! I haven't received a call from the gyno yet. Is there anything you can prescribe me to start treatment or any advice? I just feel pretty uniformed and it's scares me.       ----- Message -----   From:Nurse Practicioner Valerie Mena   Sent:3/3/2021 7:01 AM PST   To:Marisel Ramirez   Subject:RE: Non-Urgent Medical Question    No, you still need to complete the ultrasound please. You can call to schedule [phone].  Lisa MARQUEZ      ----- Message -----   From:Marisel Ramirez   Sent:3/2/2021 3:22 PM PST   To:Nurse Practicioner Valerie Mena   Subject:Non-Urgent Medical Question    Does that mean I won't be needing the ultra sound?

## 2021-05-27 ENCOUNTER — TELEPHONE (OUTPATIENT)
Dept: MEDICAL GROUP | Facility: MEDICAL CENTER | Age: 21
End: 2021-05-27

## 2021-05-27 NOTE — TELEPHONE ENCOUNTER
ESTABLISHED PATIENT PRE-VISIT PLANNING     Patient was NOT contacted to complete PVP.     Note: Patient will not be contacted if there is no indication to call.     1.  Reviewed notes from the last few office visits within the medical group: Yes    2.  If any orders were placed at last visit or intended to be done for this visit (i.e. 6 mos follow-up), do we have Results/Consult Notes?         •  Labs - Labs ordered, completed on 03/22/21 and results are in chart.  Note: If patient appointment is for lab review and patient did not complete labs, check with provider if OK to reschedule patient until labs completed.       •  Imaging - Imaging ordered, completed and results are in chart.       •  Referrals - No referrals were ordered at last office visit.    3. Is this appointment scheduled as a Hospital Follow-Up? No    4.  Immunizations were updated in Epic using Reconcile Outside Information activity? Yes    5.  Patient is due for the following Health Maintenance Topics:   Health Maintenance Due   Topic Date Due   • IMM HEP B VACCINE (3 of 3 - 3-dose primary series) 03/30/1999   • IMM PNEUMOCOCCAL VACCINE: 0-64 Years (1 of 2 - PPSV23) Never done   • IMM VARICELLA (CHICKENPOX) VACCINE (2 of 2 - 2-dose childhood series) 09/11/2008   • IMM MENINGOCOCCAL B VACCINE HEALTHY PATIENTS AGED 16 TO 23 (1 of 2 - MenB Trumenba 2-Dose Series) Never done   • IMM HPV VACCINE (2 - 3-dose series) 03/22/2021   • IMM DTaP/Tdap/Td Vaccine (5 - Td) 04/28/2021

## 2021-06-01 ENCOUNTER — OFFICE VISIT (OUTPATIENT)
Dept: MEDICAL GROUP | Facility: MEDICAL CENTER | Age: 21
End: 2021-06-01
Payer: COMMERCIAL

## 2021-06-01 VITALS
SYSTOLIC BLOOD PRESSURE: 104 MMHG | WEIGHT: 140.65 LBS | RESPIRATION RATE: 18 BRPM | DIASTOLIC BLOOD PRESSURE: 60 MMHG | HEIGHT: 65 IN | OXYGEN SATURATION: 96 % | TEMPERATURE: 97.5 F | HEART RATE: 55 BPM | BODY MASS INDEX: 23.43 KG/M2

## 2021-06-01 DIAGNOSIS — J02.9 SORE THROAT: ICD-10-CM

## 2021-06-01 DIAGNOSIS — N92.0 SPOTTING: ICD-10-CM

## 2021-06-01 DIAGNOSIS — Z23 NEED FOR VACCINATION: ICD-10-CM

## 2021-06-01 DIAGNOSIS — K21.9 GASTROESOPHAGEAL REFLUX DISEASE WITHOUT ESOPHAGITIS: ICD-10-CM

## 2021-06-01 LAB
INT CON NEG: NEGATIVE
INT CON POS: POSITIVE
S PYO AG THROAT QL: NEGATIVE

## 2021-06-01 PROCEDURE — 99213 OFFICE O/P EST LOW 20 MIN: CPT | Mod: 25 | Performed by: NURSE PRACTITIONER

## 2021-06-01 PROCEDURE — 90651 9VHPV VACCINE 2/3 DOSE IM: CPT | Performed by: NURSE PRACTITIONER

## 2021-06-01 PROCEDURE — 90471 IMMUNIZATION ADMIN: CPT | Performed by: NURSE PRACTITIONER

## 2021-06-01 PROCEDURE — 87880 STREP A ASSAY W/OPTIC: CPT | Performed by: NURSE PRACTITIONER

## 2021-06-01 RX ORDER — NITROFURANTOIN MACROCRYSTALS 50 MG/1
CAPSULE ORAL
COMMUNITY
End: 2021-06-01

## 2021-06-01 RX ORDER — IMIQUIMOD 12.5 MG/.25G
CREAM TOPICAL
COMMUNITY
Start: 2021-03-13 | End: 2021-07-08

## 2021-06-01 RX ORDER — OMEPRAZOLE 40 MG/1
40 CAPSULE, DELAYED RELEASE ORAL DAILY
Qty: 30 CAPSULE | Refills: 5 | Status: SHIPPED | OUTPATIENT
Start: 2021-06-01

## 2021-06-01 NOTE — ASSESSMENT & PLAN NOTE
This is been a chronic issue with intermittent flares, she did have an EGD performed when living in [place].  Previously had done well on omeprazole but discontinued this about a year ago when she was following a healthier diet, symptoms were reduced at that time.  Now having increased and nausea, burning, throat irritation, sometimes hoarse voice.  She has not pinpointed any specific dietary triggers, has not recently taken any PPIs.  No vomiting, abdominal pain

## 2021-06-01 NOTE — ASSESSMENT & PLAN NOTE
States that she had gone off of her birth control pill for period of time and just resumed a few weeks ago, has been having a little bit of spotting intermittently since that time.  States there is no way she could be pregnant, has not recently been sexually active

## 2021-06-01 NOTE — ASSESSMENT & PLAN NOTE
Recurrent difficulty with sore throat, states that she has been treated several times for strep at the [facility].  Throat started bothering her again about a month ago, feels worse and irritated, she has dry cough at times.  She has had increasing reflux symptoms denies fever, chills, significant congestion, body aches

## 2021-06-01 NOTE — PROGRESS NOTES
Subjective:     Chief Complaint   Patient presents with   • Advice Only     sorethroat x1wk, HPV vaccine     Marisel Ramirez is a 20 y.o. female here today to follow up on:    Gastroesophageal reflux disease without esophagitis  This is been a chronic issue with intermittent flares, she did have an EGD performed when living in [place].  Previously had done well on omeprazole but discontinued this about a year ago when she was following a healthier diet, symptoms were reduced at that time.  Now having increased and nausea, burning, throat irritation, sometimes hoarse voice.  She has not pinpointed any specific dietary triggers, has not recently taken any PPIs.  No vomiting, abdominal pain    Spotting  States that she had gone off of her birth control pill for period of time and just resumed a few weeks ago, has been having a little bit of spotting intermittently since that time.  States there is no way she could be pregnant, has not recently been sexually active    Sore throat  Recurrent difficulty with sore throat, states that she has been treated several times for strep at the [facility].  Throat started bothering her again about a month ago, feels worse and irritated, she has dry cough at times.  She has had increasing reflux symptoms denies fever, chills, significant congestion, body aches       Current medicines (including changes today)  Current Outpatient Medications   Medication Sig Dispense Refill   • imiquimod (ALDARA) 5 % cream APPLY A THIN LAYER OVER AFFECTED AREA OVER NIGHT THREE TIMES A WEEK UNTIL LESIONS ARE GONE     • omeprazole (PRILOSEC) 40 MG delayed-release capsule Take 1 capsule by mouth every day. Take 1 capsule by mouth every day 30 capsule 5   • VENTOLIN  (90 Base) MCG/ACT Aero Soln inhalation aerosol Inhale 1-2 Puffs by mouth every four hours as needed for Shortness of Breath. INHALE 1 TO 2 PUFFS BY MOUTH EVERY 6 HOURS AS NEEDED FOR SHORTNESS OF BREATH 18 g 3   • KURVELO  "0.15-30 MG-MCG per tablet Take  by mouth every day. Take 1 tablet by mouth every day (Patient not taking: Reported on 6/1/2021)       No current facility-administered medications for this visit.     She  has a past medical history of Migraine.    ROS included above     Objective:     /60 (BP Location: Left arm, Patient Position: Sitting, BP Cuff Size: Adult)   Pulse (!) 55   Temp 36.4 °C (97.5 °F) (Temporal)   Resp 18   Ht 1.66 m (5' 5.35\")   Wt 63.8 kg (140 lb 10.5 oz)   SpO2 96%  Body mass index is 23.15 kg/m².     Physical Exam:  General: Alert, oriented in no acute distress.  Eye contact is good, speech is normal, affect calm  HEENT: Posterior oropharynx pink and moist, no tonsillar enlargement, no lesions or exudate.  No lymphadenopathy.  Lungs: clear to auscultation bilaterally, normal effort, no wheeze/ rhonchi/ rales.  CV: regular rate and rhythm, S1, S2, no murmur  Ext: no edema, color normal, vascularity normal, temperature normal    Assessment and Plan:   The following treatment plan was discussed   1. Gastroesophageal reflux disease without esophagitis   intermittent issue.  Suspect current flare is contributing to her sore throat complaint.  Will resume omeprazole.  Diet and lifestyle modification discussed   2. Sore throat   rapid strep is negative in the office.  This is been a recurrent problem, she also reports hoarse voice and dry cough.  Suspect this is related to uncontrolled reflux as discussed above.  She will contact me if not improving with resuming PPI  POCT Rapid Strep A   3. Need for vaccination  I have placed the below orders and discussed them with an approved delegating provider. The MA is performing the below orders under the direction of Dr. Humberto Ashby 9   4. Spotting   is having restarted oral contraceptive a few weeks ago.  States that she has not been sexually active and there is no way she could be pregnant to balance out spotting can be common in the first few " weeks of oral contraceptive use.  She will contact me if not resolving with time       Followup: 2 weeks if no improvement, sooner as needed         Please note that this dictation was created using voice recognition software. I have worked with consultants from the vendor as well as technical experts from [facility] to optimize the interface. I have made every reasonable attempt to correct obvious errors, but I expect that there are errors of grammar and possibly content that I did not discover before finalizing the note.

## 2021-06-07 ENCOUNTER — HOSPITAL ENCOUNTER (OUTPATIENT)
Facility: MEDICAL CENTER | Age: 21
End: 2021-06-07
Attending: PHYSICIAN ASSISTANT
Payer: COMMERCIAL

## 2021-06-07 ENCOUNTER — OFFICE VISIT (OUTPATIENT)
Dept: URGENT CARE | Facility: PHYSICIAN GROUP | Age: 21
End: 2021-06-07
Payer: COMMERCIAL

## 2021-06-07 VITALS
WEIGHT: 140 LBS | RESPIRATION RATE: 12 BRPM | TEMPERATURE: 98.3 F | SYSTOLIC BLOOD PRESSURE: 110 MMHG | BODY MASS INDEX: 23.32 KG/M2 | HEIGHT: 65 IN | HEART RATE: 62 BPM | DIASTOLIC BLOOD PRESSURE: 62 MMHG | OXYGEN SATURATION: 96 %

## 2021-06-07 DIAGNOSIS — N76.1 SUBACUTE ON CHRONIC VAGINITIS: ICD-10-CM

## 2021-06-07 LAB
APPEARANCE UR: CLEAR
BILIRUB UR STRIP-MCNC: NEGATIVE MG/DL
COLOR UR AUTO: NORMAL
GLUCOSE UR STRIP.AUTO-MCNC: NEGATIVE MG/DL
INT CON NEG: NORMAL
INT CON POS: NORMAL
KETONES UR STRIP.AUTO-MCNC: NEGATIVE MG/DL
LEUKOCYTE ESTERASE UR QL STRIP.AUTO: NEGATIVE
NITRITE UR QL STRIP.AUTO: NEGATIVE
PH UR STRIP.AUTO: 5.5 [PH] (ref 5–8)
POC URINE PREGNANCY TEST: NEGATIVE
PROT UR QL STRIP: NEGATIVE MG/DL
RBC UR QL AUTO: NEGATIVE
SP GR UR STRIP.AUTO: 1.03
UROBILINOGEN UR STRIP-MCNC: 0.2 MG/DL

## 2021-06-07 PROCEDURE — 99213 OFFICE O/P EST LOW 20 MIN: CPT | Performed by: PHYSICIAN ASSISTANT

## 2021-06-07 PROCEDURE — 87086 URINE CULTURE/COLONY COUNT: CPT

## 2021-06-07 PROCEDURE — 87510 GARDNER VAG DNA DIR PROBE: CPT

## 2021-06-07 PROCEDURE — 87480 CANDIDA DNA DIR PROBE: CPT

## 2021-06-07 PROCEDURE — 87660 TRICHOMONAS VAGIN DIR PROBE: CPT

## 2021-06-07 PROCEDURE — 81002 URINALYSIS NONAUTO W/O SCOPE: CPT | Performed by: PHYSICIAN ASSISTANT

## 2021-06-07 PROCEDURE — 81025 URINE PREGNANCY TEST: CPT | Performed by: PHYSICIAN ASSISTANT

## 2021-06-07 ASSESSMENT — ENCOUNTER SYMPTOMS
VAGINITIS: 1
FLANK PAIN: 0
FEVER: 0
CHILLS: 0
SHORTNESS OF BREATH: 0
NAUSEA: 0
ABDOMINAL PAIN: 0
VOMITING: 0

## 2021-06-07 NOTE — PROGRESS NOTES
"Subjective:   Marisel Ramirez  is a 20 y.o. female who presents for Vaginitis (x2weeks, yeast infection, order, burning when urinating )      Vaginitis  The patient's primary symptoms include vaginal discharge. This is a new problem. The current episode started 1 to 4 weeks ago. Pertinent negatives include no abdominal pain, chills, dysuria, fever, flank pain, frequency, hematuria, nausea, rash, urgency or vomiting.   Patient presents urgent care complaining of symptoms of vaginitis.  She notes waxing waning symptoms fairly chronically.  She states she has seen primary care as well as urology over the last few weeks.  She was treated for what she thought was urinary tract infection but upon reviewing patient's med list on her phone she realized was Flagyl for BV.  She denies much symptoms of UTI currently but has history of recurrence.  She describes dark color of urine and noting slight abnormal vaginal discharge recently.  She describes the discharge is slightly lighter coloration slightly malodorous.  Patient denies chance of pregnancy.  Patient denies concern for STIs and declines testing today.  She notes symptoms are very mild but would like test to see if any illness persists.    Review of Systems   Constitutional: Negative for chills and fever.   Respiratory: Negative for shortness of breath.    Gastrointestinal: Negative for abdominal pain, nausea and vomiting.   Genitourinary: Positive for vaginal discharge. Negative for dysuria, flank pain, frequency, hematuria and urgency.        Dark urine abnormal discharge   Skin: Negative for rash.       No Known Allergies     Objective:   /62 (BP Location: Right arm, Patient Position: Sitting, BP Cuff Size: Adult)   Pulse 62   Temp 36.8 °C (98.3 °F) (Temporal)   Resp 12   Ht 1.651 m (5' 5\")   Wt 63.5 kg (140 lb)   SpO2 96%   BMI 23.30 kg/m²     Physical Exam  Vitals and nursing note reviewed.   Constitutional:       General: She is not in acute " distress.     Appearance: She is well-developed. She is not diaphoretic.   HENT:      Head: Normocephalic and atraumatic.      Right Ear: External ear normal.      Left Ear: External ear normal.      Nose: Nose normal.   Eyes:      General: Lids are normal. No scleral icterus.        Right eye: No discharge.         Left eye: No discharge.      Conjunctiva/sclera: Conjunctivae normal.   Pulmonary:      Effort: Pulmonary effort is normal. No respiratory distress.   Abdominal:      Tenderness: There is no right CVA tenderness or left CVA tenderness.   Musculoskeletal:         General: Normal range of motion.      Cervical back: Neck supple.   Skin:     General: Skin is warm and dry.      Coloration: Skin is not pale.      Findings: No erythema.   Neurological:      Mental Status: She is alert and oriented to person, place, and time. She is not disoriented.   Psychiatric:         Speech: Speech normal.         Behavior: Behavior normal.       POCT UA - NEG / NORMAL  POCT HCG - NEG    Urine cx pending  Vag path pending    Assessment/Plan:   1. Subacute on chronic vaginitis  - POCT Urinalysis  - POCT Pregnancy  - VAGINAL PATHOGENS DNA PANEL; Future  - URINE CULTURE(NEW); Future  -We will hold off on treatment with minimal symptoms currently patient having a number of recent treatments  -[facility] urine culture [facility]  -Follow-up with primary care and urology  -Return to clinic with lack of resolution or progression of symptoms.  ER precautions with any worsening symptoms are reviewed with patient/caregiver and they do express understandin    I have worn an N95 mask, gloves and eye protection for the entire encounter with this patient.     Differential diagnosis, natural history, supportive care, and indications for immediate follow-up discussed.

## 2021-06-09 LAB
BACTERIA UR CULT: NORMAL
SIGNIFICANT IND 70042: NORMAL
SITE SITE: NORMAL
SOURCE SOURCE: NORMAL

## 2021-06-14 DIAGNOSIS — J45.21 MILD INTERMITTENT ASTHMA WITH ACUTE EXACERBATION: ICD-10-CM

## 2021-06-14 DIAGNOSIS — J06.9 VIRAL UPPER RESPIRATORY TRACT INFECTION: ICD-10-CM

## 2021-06-14 RX ORDER — ALBUTEROL SULFATE 90 UG/1
AEROSOL, METERED RESPIRATORY (INHALATION)
Qty: 18 G | Refills: 3 | Status: SHIPPED | OUTPATIENT
Start: 2021-06-14

## 2021-07-02 ENCOUNTER — HOSPITAL ENCOUNTER (OUTPATIENT)
Dept: RADIOLOGY | Facility: MEDICAL CENTER | Age: 21
End: 2021-07-02
Attending: PHYSICIAN ASSISTANT
Payer: COMMERCIAL

## 2021-07-02 DIAGNOSIS — N39.0 URINARY TRACT INFECTION WITHOUT HEMATURIA, SITE UNSPECIFIED: ICD-10-CM

## 2021-07-02 PROCEDURE — 76775 US EXAM ABDO BACK WALL LIM: CPT

## 2021-07-08 ENCOUNTER — OFFICE VISIT (OUTPATIENT)
Dept: URGENT CARE | Facility: CLINIC | Age: 21
End: 2021-07-08
Payer: COMMERCIAL

## 2021-07-08 VITALS
WEIGHT: 141.2 LBS | OXYGEN SATURATION: 96 % | SYSTOLIC BLOOD PRESSURE: 122 MMHG | HEIGHT: 65 IN | DIASTOLIC BLOOD PRESSURE: 80 MMHG | TEMPERATURE: 97.2 F | BODY MASS INDEX: 23.53 KG/M2 | RESPIRATION RATE: 14 BRPM | HEART RATE: 78 BPM

## 2021-07-08 DIAGNOSIS — R19.5 ABNORMAL STOOLS: ICD-10-CM

## 2021-07-08 DIAGNOSIS — L75.0 ABNORMAL BODY ODOR: ICD-10-CM

## 2021-07-08 PROCEDURE — 99214 OFFICE O/P EST MOD 30 MIN: CPT | Performed by: FAMILY MEDICINE

## 2021-07-08 RX ORDER — ALBUTEROL SULFATE 0.63 MG/3ML
SOLUTION RESPIRATORY (INHALATION)
COMMUNITY

## 2021-07-08 NOTE — PROGRESS NOTES
"Subjective:      Marisel Ramirez is a 20 y.o. female who presents with Other (2x month, burnt smell, usually occurs when sweating/ urinating)      - This is a pleasant and nontoxic appearing 20 y.o. female with c/o body odor like something is burnt for about past 2 months, seems more if working out hiking. No NVFC, feels well otherwise except for stools have been soft not normal for past 2 months. No bloody stools.      No new diet changes, recently started BCP around 2 months ago. No vaginal dc/odor, no urinary symptoms. Feels well otherwise       ALLERGIES:  Patient has no known allergies.     PMH:  Past Medical History:   Diagnosis Date   • Migraine         PSH:  History reviewed. No pertinent surgical history.    MEDS:    Current Outpatient Medications:   •  VENTOLIN  (90 Base) MCG/ACT Aero Soln inhalation aerosol, INHALE 1 TO 2 PUFF BY MOUTH EVERY 4 HOURS AS NEEDED FOR SHORTNESS OF BREATH, Disp: 18 g, Rfl: 3  •  omeprazole (PRILOSEC) 40 MG delayed-release capsule, Take 1 capsule by mouth every day. Take 1 capsule by mouth every day, Disp: 30 capsule, Rfl: 5  •  albuterol (ACCUNEB) 0.63 MG/3ML nebulizer solution, albuterol sulfate 0.63 mg/3 mL solution for nebulization  Inhale by inhalation route., Disp: , Rfl:   •  Levonorgestrel-Ethinyl Estrad (KURVELO PO), 1 tablet., Disp: , Rfl:     ** I have documented what I find to be significant in regards to past medical, social, family and surgical history  in my HPI or under PMH/PSH/FH review section, otherwise it is noncontributory **     HPI    Review of Systems   All other systems reviewed and are negative.     Objective:     /80 (BP Location: Left arm, Patient Position: Sitting, BP Cuff Size: Adult)   Pulse 78   Temp 36.2 °C (97.2 °F) (Temporal)   Resp 14   Ht 1.651 m (5' 5\")   Wt 64 kg (141 lb 3.2 oz)   SpO2 96%   BMI 23.50 kg/m²      Physical Exam  Vitals and nursing note reviewed.   Constitutional:       General: She is not in acute " distress.     Appearance: Normal appearance. She is well-developed.   HENT:      Head: Normocephalic and atraumatic.      Mouth/Throat:      Mouth: Mucous membranes are moist.      Pharynx: Oropharynx is clear.   Eyes:      General: No scleral icterus.  Cardiovascular:      Heart sounds: Normal heart sounds. No murmur heard.     Pulmonary:      Effort: Pulmonary effort is normal. No respiratory distress.      Breath sounds: Normal breath sounds.   Skin:     Coloration: Skin is not jaundiced or pale.   Neurological:      Mental Status: She is alert.      Motor: No abnormal muscle tone.   Psychiatric:         Mood and Affect: Mood normal.         Behavior: Behavior normal.       Assessment/Plan:          1. Abnormal body odor  CBC WITH DIFFERENTIAL    Comp Metabolic Panel   2. Abnormal stools  REFERRAL TO GASTROENTEROLOGY       - Dx, plan & d/c instructions discussed w/ patient  - Rest, stay hydrated OTC Motrin and/or Tylenol as needed  - E.R. precautions discussed     Asked to kindly follow up with their PCP's office in 2-3 days for a recheck, ER if not improving or feeling/getting worse.    Any realistic side effects of medications that may have been given today reviewed.     Patient left in stable condition     Please note that this dictation was created using voice recognition software. I have made every reasonable attempt to correct obvious errors, but I expect that there are errors of grammar and possibly content that I did not discover before finalizing the note.

## 2021-07-14 ENCOUNTER — TELEPHONE (OUTPATIENT)
Dept: URGENT CARE | Facility: CLINIC | Age: 21
End: 2021-07-14

## 2021-07-14 LAB
ALBUMIN SERPL-MCNC: 4.3 G/DL (ref 3.9–5)
ALBUMIN/GLOB SERPL: 1.3 {RATIO} (ref 1.2–2.2)
ALP SERPL-CCNC: 67 IU/L (ref 48–121)
ALT SERPL-CCNC: 47 IU/L (ref 0–32)
AST SERPL-CCNC: 80 IU/L (ref 0–40)
BASOPHILS # BLD AUTO: 0.1 X10E3/UL (ref 0–0.2)
BASOPHILS NFR BLD AUTO: 1 %
BILIRUB SERPL-MCNC: 0.4 MG/DL (ref 0–1.2)
BUN SERPL-MCNC: 10 MG/DL (ref 6–20)
BUN/CREAT SERPL: 10 (ref 9–23)
CALCIUM SERPL-MCNC: 9.9 MG/DL (ref 8.7–10.2)
CHLORIDE SERPL-SCNC: 102 MMOL/L (ref 96–106)
CO2 SERPL-SCNC: 23 MMOL/L (ref 20–29)
CREAT SERPL-MCNC: 1.01 MG/DL (ref 0.57–1)
EOSINOPHIL # BLD AUTO: 0.3 X10E3/UL (ref 0–0.4)
EOSINOPHIL NFR BLD AUTO: 5 %
ERYTHROCYTE [DISTWIDTH] IN BLOOD BY AUTOMATED COUNT: 14.4 % (ref 11.7–15.4)
GLOBULIN SER CALC-MCNC: 3.4 G/DL (ref 1.5–4.5)
GLUCOSE SERPL-MCNC: 89 MG/DL (ref 65–99)
HCT VFR BLD AUTO: 42.5 % (ref 34–46.6)
HGB BLD-MCNC: 14 G/DL (ref 11.1–15.9)
IMM GRANULOCYTES # BLD AUTO: 0 X10E3/UL (ref 0–0.1)
IMM GRANULOCYTES NFR BLD AUTO: 0 %
LYMPHOCYTES # BLD AUTO: 2.5 X10E3/UL (ref 0.7–3.1)
LYMPHOCYTES NFR BLD AUTO: 45 %
MCH RBC QN AUTO: 26.9 PG (ref 26.6–33)
MCHC RBC AUTO-ENTMCNC: 32.9 G/DL (ref 31.5–35.7)
MCV RBC AUTO: 82 FL (ref 79–97)
MONOCYTES # BLD AUTO: 0.4 X10E3/UL (ref 0.1–0.9)
MONOCYTES NFR BLD AUTO: 8 %
NEUTROPHILS # BLD AUTO: 2.3 X10E3/UL (ref 1.4–7)
NEUTROPHILS NFR BLD AUTO: 41 %
PLATELET # BLD AUTO: 311 X10E3/UL (ref 150–450)
POTASSIUM SERPL-SCNC: 4 MMOL/L (ref 3.5–5.2)
PROT SERPL-MCNC: 7.7 G/DL (ref 6–8.5)
RBC # BLD AUTO: 5.2 X10E6/UL (ref 3.77–5.28)
SODIUM SERPL-SCNC: 138 MMOL/L (ref 134–144)
WBC # BLD AUTO: 5.5 X10E3/UL (ref 3.4–10.8)

## 2021-07-14 NOTE — TELEPHONE ENCOUNTER
Called patient and left a message  Normal CBC  CMP showed elevated transaminase level.  Recommended follow-up in urgent care primary care this week to further discussed and also recommended to avoid over-the-counter medication like Tylenol.

## 2021-07-15 ENCOUNTER — OFFICE VISIT (OUTPATIENT)
Dept: URGENT CARE | Facility: CLINIC | Age: 21
End: 2021-07-15
Payer: COMMERCIAL

## 2021-07-15 VITALS
RESPIRATION RATE: 16 BRPM | OXYGEN SATURATION: 95 % | TEMPERATURE: 98.5 F | BODY MASS INDEX: 25.16 KG/M2 | SYSTOLIC BLOOD PRESSURE: 110 MMHG | WEIGHT: 151 LBS | HEART RATE: 78 BPM | DIASTOLIC BLOOD PRESSURE: 60 MMHG | HEIGHT: 65 IN

## 2021-07-15 ASSESSMENT — FIBROSIS 4 INDEX: FIB4 SCORE: 0.83

## 2021-07-15 NOTE — PROGRESS NOTES
Subjective:   Marisel Ramirez is a 20 y.o. female who presents for No chief complaint on file.      HPI    ROS    Patient Active Problem List   Diagnosis   • Family planning   • Dysuria   • Cyst of right Bartholin's gland   • Lower abdominal pain   • Anxiety   • Gastroesophageal reflux disease without esophagitis   • Environmental allergies   • Intrinsic eczema   • Grief reaction   • Screening examination for STD (sexually transmitted disease)   • Candidal vulvovaginitis   • Vaginal lesion   • Thyromegaly   • Spotting   • Sore throat     History reviewed. No pertinent surgical history.  Social History     Tobacco Use   • Smoking status: Never Smoker   • Smokeless tobacco: Never Used   Vaping Use   • Vaping Use: Former   • Quit date: 11/12/2020   Substance Use Topics   • Alcohol use: Yes     Alcohol/week: 4.2 oz     Types: 7 Shots of liquor per week     Comment: once every week   • Drug use: Yes     Frequency: 7.0 times per week     Types: Marijuana, Oral, Inhaled      Family History   Problem Relation Age of Onset   • Hypertension Mother    • Alcohol/Drug Father       (Allergies, Medications, & Tobacco/Substance Use were reconciled by the Medical Assistant and reviewed by myself. The family history is prepopulated)     Objective:     There were no vitals taken for this visit.    Physical Exam    Assessment/Plan:   No diagnosis found.      Differential diagnosis, natural history, supportive care, and indications for immediate follow-up discussed.    Advised the patient to follow-up with the primary care physician for recheck, reevaluation, and consideration of further management.    Please note that this dictation was created using voice recognition software. I have made a reasonable attempt to correct obvious errors, but I expect that there are errors of grammar and possibly content that I did not discover before finalizing the note.    This note was electronically signed ALMA Freire

## 2022-10-12 ENCOUNTER — TELEPHONE (OUTPATIENT)
Dept: MEDICAL GROUP | Facility: MEDICAL CENTER | Age: 22
End: 2022-10-12

## 2025-06-08 NOTE — HPI: RASH (ECZEMA)
How Severe Is Your Eczema?: mild
Is This A New Presentation, Or A Follow-Up?: Follow Up Eczema
no weight-bearing restrictions